# Patient Record
Sex: FEMALE | Race: WHITE | Employment: UNEMPLOYED | URBAN - METROPOLITAN AREA
[De-identification: names, ages, dates, MRNs, and addresses within clinical notes are randomized per-mention and may not be internally consistent; named-entity substitution may affect disease eponyms.]

---

## 2022-06-18 ENCOUNTER — APPOINTMENT (OUTPATIENT)
Dept: CT IMAGING | Age: 77
DRG: 482 | End: 2022-06-18
Payer: MEDICARE

## 2022-06-18 ENCOUNTER — APPOINTMENT (OUTPATIENT)
Dept: GENERAL RADIOLOGY | Age: 77
DRG: 482 | End: 2022-06-18
Payer: MEDICARE

## 2022-06-18 ENCOUNTER — HOSPITAL ENCOUNTER (INPATIENT)
Age: 77
LOS: 7 days | Discharge: NURSING, REHAB OR LONG-TERM CARE | DRG: 482 | End: 2022-06-25
Attending: EMERGENCY MEDICINE | Admitting: INTERNAL MEDICINE
Payer: MEDICARE
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
## 2022-06-18 DIAGNOSIS — S72.141A CLOSED INTERTROCHANTERIC FRACTURE OF HIP, RIGHT, INITIAL ENCOUNTER (HCC): Primary | ICD-10-CM

## 2022-06-18 LAB
A/G RATIO: 2.1 (ref 1.1–2.2)
ALBUMIN SERPL-MCNC: 4.4 G/DL (ref 3.4–5)
ALP BLD-CCNC: 123 U/L (ref 40–129)
ALT SERPL-CCNC: 13 U/L (ref 10–40)
ANION GAP SERPL CALCULATED.3IONS-SCNC: 12 MMOL/L (ref 3–16)
APTT: 29.5 SEC (ref 23–34.3)
AST SERPL-CCNC: 15 U/L (ref 15–37)
BASOPHILS ABSOLUTE: 0.1 K/UL (ref 0–0.2)
BASOPHILS RELATIVE PERCENT: 0.7 %
BILIRUB SERPL-MCNC: <0.2 MG/DL (ref 0–1)
BUN BLDV-MCNC: 21 MG/DL (ref 7–20)
CALCIUM SERPL-MCNC: 9.8 MG/DL (ref 8.3–10.6)
CHLORIDE BLD-SCNC: 105 MMOL/L (ref 99–110)
CO2: 20 MMOL/L (ref 21–32)
CREAT SERPL-MCNC: 0.8 MG/DL (ref 0.6–1.2)
EOSINOPHILS ABSOLUTE: 0.2 K/UL (ref 0–0.6)
EOSINOPHILS RELATIVE PERCENT: 1.6 %
GFR AFRICAN AMERICAN: >60
GFR NON-AFRICAN AMERICAN: >60
GLUCOSE BLD-MCNC: 104 MG/DL (ref 70–99)
HCT VFR BLD CALC: 42.6 % (ref 36–48)
HEMOGLOBIN: 14.1 G/DL (ref 12–16)
INR BLD: 1.01 (ref 0.87–1.14)
LYMPHOCYTES ABSOLUTE: 3.3 K/UL (ref 1–5.1)
LYMPHOCYTES RELATIVE PERCENT: 30.7 %
MCH RBC QN AUTO: 31.7 PG (ref 26–34)
MCHC RBC AUTO-ENTMCNC: 33.1 G/DL (ref 31–36)
MCV RBC AUTO: 96 FL (ref 80–100)
MONOCYTES ABSOLUTE: 1 K/UL (ref 0–1.3)
MONOCYTES RELATIVE PERCENT: 9.4 %
NEUTROPHILS ABSOLUTE: 6.2 K/UL (ref 1.7–7.7)
NEUTROPHILS RELATIVE PERCENT: 57.6 %
PDW BLD-RTO: 15 % (ref 12.4–15.4)
PLATELET # BLD: 235 K/UL (ref 135–450)
PMV BLD AUTO: 8.9 FL (ref 5–10.5)
POTASSIUM REFLEX MAGNESIUM: 4 MMOL/L (ref 3.5–5.1)
PROTHROMBIN TIME: 13.1 SEC (ref 11.7–14.5)
RBC # BLD: 4.44 M/UL (ref 4–5.2)
SARS-COV-2, NAAT: NOT DETECTED
SODIUM BLD-SCNC: 137 MMOL/L (ref 136–145)
SPECIMEN STATUS: NORMAL
TOTAL PROTEIN: 6.5 G/DL (ref 6.4–8.2)
WBC # BLD: 10.7 K/UL (ref 4–11)

## 2022-06-18 PROCEDURE — 80053 COMPREHEN METABOLIC PANEL: CPT

## 2022-06-18 PROCEDURE — 85610 PROTHROMBIN TIME: CPT

## 2022-06-18 PROCEDURE — 6360000002 HC RX W HCPCS: Performed by: NURSE PRACTITIONER

## 2022-06-18 PROCEDURE — 2700000000 HC OXYGEN THERAPY PER DAY

## 2022-06-18 PROCEDURE — 6360000002 HC RX W HCPCS

## 2022-06-18 PROCEDURE — 73502 X-RAY EXAM HIP UNI 2-3 VIEWS: CPT

## 2022-06-18 PROCEDURE — 6370000000 HC RX 637 (ALT 250 FOR IP): Performed by: INTERNAL MEDICINE

## 2022-06-18 PROCEDURE — 1200000000 HC SEMI PRIVATE

## 2022-06-18 PROCEDURE — 70450 CT HEAD/BRAIN W/O DYE: CPT

## 2022-06-18 PROCEDURE — 85025 COMPLETE CBC W/AUTO DIFF WBC: CPT

## 2022-06-18 PROCEDURE — 6360000002 HC RX W HCPCS: Performed by: EMERGENCY MEDICINE

## 2022-06-18 PROCEDURE — 96376 TX/PRO/DX INJ SAME DRUG ADON: CPT

## 2022-06-18 PROCEDURE — 87635 SARS-COV-2 COVID-19 AMP PRB: CPT

## 2022-06-18 PROCEDURE — 71045 X-RAY EXAM CHEST 1 VIEW: CPT

## 2022-06-18 PROCEDURE — 93005 ELECTROCARDIOGRAM TRACING: CPT | Performed by: NURSE PRACTITIONER

## 2022-06-18 PROCEDURE — 73552 X-RAY EXAM OF FEMUR 2/>: CPT

## 2022-06-18 PROCEDURE — 96374 THER/PROPH/DIAG INJ IV PUSH: CPT

## 2022-06-18 PROCEDURE — 96375 TX/PRO/DX INJ NEW DRUG ADDON: CPT

## 2022-06-18 PROCEDURE — 99285 EMERGENCY DEPT VISIT HI MDM: CPT

## 2022-06-18 PROCEDURE — 85730 THROMBOPLASTIN TIME PARTIAL: CPT

## 2022-06-18 RX ORDER — MORPHINE SULFATE 4 MG/ML
4 INJECTION, SOLUTION INTRAMUSCULAR; INTRAVENOUS
Status: DISCONTINUED | OUTPATIENT
Start: 2022-06-18 | End: 2022-06-19

## 2022-06-18 RX ORDER — SODIUM CHLORIDE 0.9 % (FLUSH) 0.9 %
5-40 SYRINGE (ML) INJECTION PRN
Status: DISCONTINUED | OUTPATIENT
Start: 2022-06-18 | End: 2022-06-25 | Stop reason: HOSPADM

## 2022-06-18 RX ORDER — ONDANSETRON 2 MG/ML
4 INJECTION INTRAMUSCULAR; INTRAVENOUS ONCE
Status: COMPLETED | OUTPATIENT
Start: 2022-06-18 | End: 2022-06-18

## 2022-06-18 RX ORDER — ONDANSETRON 2 MG/ML
4 INJECTION INTRAMUSCULAR; INTRAVENOUS EVERY 6 HOURS PRN
Status: DISCONTINUED | OUTPATIENT
Start: 2022-06-18 | End: 2022-06-25 | Stop reason: HOSPADM

## 2022-06-18 RX ORDER — LOSARTAN POTASSIUM 25 MG/1
100 TABLET ORAL DAILY
Status: DISCONTINUED | OUTPATIENT
Start: 2022-06-19 | End: 2022-06-25 | Stop reason: HOSPADM

## 2022-06-18 RX ORDER — AMLODIPINE BESYLATE 2.5 MG/1
2.5 TABLET ORAL DAILY
COMMUNITY

## 2022-06-18 RX ORDER — MORPHINE SULFATE 4 MG/ML
4 INJECTION, SOLUTION INTRAMUSCULAR; INTRAVENOUS
Status: DISCONTINUED | OUTPATIENT
Start: 2022-06-18 | End: 2022-06-20

## 2022-06-18 RX ORDER — ASPIRIN 325 MG
325 TABLET ORAL DAILY
Status: ON HOLD | COMMUNITY
End: 2022-06-25 | Stop reason: HOSPADM

## 2022-06-18 RX ORDER — AMLODIPINE BESYLATE 5 MG/1
5 TABLET ORAL DAILY
Status: DISCONTINUED | OUTPATIENT
Start: 2022-06-19 | End: 2022-06-25 | Stop reason: HOSPADM

## 2022-06-18 RX ORDER — SODIUM CHLORIDE 0.9 % (FLUSH) 0.9 %
5-40 SYRINGE (ML) INJECTION EVERY 12 HOURS SCHEDULED
Status: DISCONTINUED | OUTPATIENT
Start: 2022-06-18 | End: 2022-06-25 | Stop reason: HOSPADM

## 2022-06-18 RX ORDER — ALBUTEROL SULFATE 90 UG/1
2 AEROSOL, METERED RESPIRATORY (INHALATION) 4 TIMES DAILY
Status: DISCONTINUED | OUTPATIENT
Start: 2022-06-18 | End: 2022-06-19

## 2022-06-18 RX ORDER — MORPHINE SULFATE 4 MG/ML
4 INJECTION, SOLUTION INTRAMUSCULAR; INTRAVENOUS ONCE
Status: COMPLETED | OUTPATIENT
Start: 2022-06-18 | End: 2022-06-18

## 2022-06-18 RX ORDER — MORPHINE SULFATE 2 MG/ML
2 INJECTION, SOLUTION INTRAMUSCULAR; INTRAVENOUS
Status: DISCONTINUED | OUTPATIENT
Start: 2022-06-18 | End: 2022-06-20

## 2022-06-18 RX ORDER — MORPHINE SULFATE 4 MG/ML
INJECTION, SOLUTION INTRAMUSCULAR; INTRAVENOUS
Status: COMPLETED
Start: 2022-06-18 | End: 2022-06-18

## 2022-06-18 RX ORDER — SODIUM CHLORIDE 9 MG/ML
INJECTION, SOLUTION INTRAVENOUS PRN
Status: DISCONTINUED | OUTPATIENT
Start: 2022-06-18 | End: 2022-06-25 | Stop reason: HOSPADM

## 2022-06-18 RX ORDER — GLUCOSAM/CHONDRO/HERB 149/HYAL 750-100 MG
1 TABLET ORAL
COMMUNITY

## 2022-06-18 RX ORDER — OMEGA-3S/DHA/EPA/FISH OIL/D3 300MG-1000
400 CAPSULE ORAL DAILY
COMMUNITY

## 2022-06-18 RX ORDER — OXYCODONE HYDROCHLORIDE 5 MG/1
5 TABLET ORAL EVERY 4 HOURS PRN
Status: DISCONTINUED | OUTPATIENT
Start: 2022-06-18 | End: 2022-06-25 | Stop reason: HOSPADM

## 2022-06-18 RX ORDER — OXYCODONE HYDROCHLORIDE 5 MG/1
10 TABLET ORAL EVERY 4 HOURS PRN
Status: DISCONTINUED | OUTPATIENT
Start: 2022-06-18 | End: 2022-06-25 | Stop reason: HOSPADM

## 2022-06-18 RX ORDER — ONDANSETRON 2 MG/ML
INJECTION INTRAMUSCULAR; INTRAVENOUS
Status: COMPLETED
Start: 2022-06-18 | End: 2022-06-18

## 2022-06-18 RX ORDER — ESCITALOPRAM OXALATE 10 MG/1
20 TABLET ORAL DAILY
Status: DISCONTINUED | OUTPATIENT
Start: 2022-06-19 | End: 2022-06-25 | Stop reason: HOSPADM

## 2022-06-18 RX ORDER — SODIUM CHLORIDE 9 MG/ML
INJECTION, SOLUTION INTRAVENOUS CONTINUOUS
Status: DISCONTINUED | OUTPATIENT
Start: 2022-06-18 | End: 2022-06-22

## 2022-06-18 RX ORDER — POLYETHYLENE GLYCOL 3350 17 G/17G
17 POWDER, FOR SOLUTION ORAL DAILY PRN
Status: DISCONTINUED | OUTPATIENT
Start: 2022-06-18 | End: 2022-06-19

## 2022-06-18 RX ORDER — ESCITALOPRAM OXALATE 10 MG/1
10 TABLET ORAL DAILY
COMMUNITY

## 2022-06-18 RX ORDER — ONDANSETRON 4 MG/1
4 TABLET, ORALLY DISINTEGRATING ORAL EVERY 8 HOURS PRN
Status: DISCONTINUED | OUTPATIENT
Start: 2022-06-18 | End: 2022-06-25 | Stop reason: HOSPADM

## 2022-06-18 RX ADMIN — ONDANSETRON 4 MG: 2 INJECTION INTRAMUSCULAR; INTRAVENOUS at 19:39

## 2022-06-18 RX ADMIN — MORPHINE SULFATE 4 MG: 4 INJECTION, SOLUTION INTRAMUSCULAR; INTRAVENOUS at 19:39

## 2022-06-18 RX ADMIN — MORPHINE SULFATE 4 MG: 4 INJECTION, SOLUTION INTRAMUSCULAR; INTRAVENOUS at 20:29

## 2022-06-18 RX ADMIN — MORPHINE SULFATE 4 MG: 4 INJECTION, SOLUTION INTRAMUSCULAR; INTRAVENOUS at 22:43

## 2022-06-18 RX ADMIN — MORPHINE SULFATE 4 MG: 4 INJECTION, SOLUTION INTRAMUSCULAR; INTRAVENOUS at 21:39

## 2022-06-18 ASSESSMENT — PAIN SCALES - GENERAL
PAINLEVEL_OUTOF10: 5
PAINLEVEL_OUTOF10: 7
PAINLEVEL_OUTOF10: 6
PAINLEVEL_OUTOF10: 8

## 2022-06-18 ASSESSMENT — PAIN DESCRIPTION - INTENSITY: RATING_2: 2

## 2022-06-18 ASSESSMENT — PAIN DESCRIPTION - ORIENTATION
ORIENTATION_2: RIGHT
ORIENTATION: RIGHT
ORIENTATION: RIGHT

## 2022-06-18 ASSESSMENT — PAIN DESCRIPTION - LOCATION
LOCATION: HIP
LOCATION_2: ELBOW
LOCATION: HIP

## 2022-06-18 ASSESSMENT — PAIN - FUNCTIONAL ASSESSMENT: PAIN_FUNCTIONAL_ASSESSMENT: 0-10

## 2022-06-18 NOTE — ED TRIAGE NOTES
Philip Beauchamp is a 78 y.o. female who presents to the ED via EMS for 5 out of 10 constant right hip pain. Pt reports symptoms began at approximately 1900 today after sustaining a fall. Pt also reports she was walking to her car and her shoe slipped off, causing her to trip. Pt fell, with right hip being the point of impact onto concrete. Shortening and external rotation appear present on arrival to ED, with 3+/4 bilateral pedal pulses. Pt denies hitting head, LOC, anticoagulation, musculoskeletal or surgical Hx on affected limb, numbness/tingling. Pt resting in bed with call light within reach and updated on plan of care; pending provider to assess. Pt denies any needs at this time.

## 2022-06-18 NOTE — ED PROVIDER NOTES
I independently evaluated and obtained a history and physical on Jaye Phelps. I personally saw the patient and performed a substantive portion of the visit including all aspects of the medical decision making. All diagnostic, treatment, and disposition assistants were made to myself in conjunction the advanced practice provider. For further details of this patient's emergency department encounter, please see the advanced practice provider's documentation. History: Patient is a 77-year-old female who presents with right hip pain. Patient ports approximately 7 PM today she suffered a mechanical fall when her shoe slipped off when she was walking to her car causing her to trip and fall onto her right hip. She denies any pain any location other than her right hip. She reports her pain is severe constant and worsening. She denies any numbness or weakness. Physician Exam: Pleasant elderly  female. Right lower extremity shortened and externally rotated. 2+ DP pulses to right lower extremity. Sensation intact all nerve distributions of right lower extremity. MDM:    I personally saw the patient and performed a substantive portion of the visit including all aspects of the medical decision making. Work-up shows right intramedullary femur fracture. Patient has neurovascular intact. Patient does require multiple doses of IV pain medication to control her pain. She is admitted to hospital service with orthopedic surgery consulting on 4 surgical management. All parties expressed understanding and agreement with this plan. FINAL IMPRESSION      1.  [facility]               Rajesh Brady MD  06/18/22 3288

## 2022-06-18 NOTE — ED PROVIDER NOTES
Packs/day: 0.50    Smokeless tobacco: Never Used   Vaping Use    Vaping Use: Never used   Substance and Sexual Activity    Alcohol use: Yes    Drug use: Not on file    Sexual activity: Not on file   Other Topics Concern    Not on file   Social History Narrative    Not on file     Social Determinants of Health     Financial Resource Strain:     Difficulty of Paying Living Expenses: Not on file   Food Insecurity:     Worried About Running Out of Food in the Last Year: Not on file    Laisha of Food in the Last Year: Not on file   Transportation Needs:     Lack of Transportation (Medical): Not on file    Lack of Transportation (Non-Medical):  Not on file   Physical Activity:     Days of Exercise per Week: Not on file    Minutes of Exercise per Session: Not on file   Stress:     Feeling of Stress : Not on file   Social Connections:     Frequency of Communication with Friends and Family: Not on file    Frequency of Social Gatherings with Friends and Family: Not on file    Attends [employer] Services: Not on file    Active Member of [address] [place] [address] or Organizations: Not on file    Attends Club or Organization Meetings: Not on file    Marital Status: Not on file   Intimate Partner Violence:     Fear of Current or Ex-Partner: Not on file    Emotionally Abused: Not on file    Physically Abused: Not on file    Sexually Abused: Not on file   Housing Stability:     Unable to Pay for Housing in the Last Year: Not on file    Number of [place] in the Last Year: Not on file    Unstable Housing in the Last Year: Not on file     Current Facility-Administered Medications   Medication Dose Route Frequency Provider Last Rate Last Admin    morphine sulfate (PF) injection 4 mg  4 mg IntraVENous Q1H PRN EVELYN Horton - CNP   4 mg at 06/18/22 2139    [START ON 6/19/2022] ceFAZolin (ANCEF) 2000 mg in dextrose 5 % 100 mL IVPB  2,000 mg IntraVENous On Call to [id number] [facility], DO         No current outpatient medications on file. Allergies   Allergen Reactions    Latex Hives     \"My skin peels off if I have latex tape placed\"    Sulfa Antibiotics Hives and Itching    Levofloxacin      Throat tightness, flushed, redness  On 8th day of 10 day regimen     REVIEW OF SYSTEMS  10 systemsreviewed, pertinent positives per HPI otherwise noted to be negative    PHYSICAL EXAM  Physical Exam  Vitals:    06/18/22 2122   BP: (!) 137/95   Pulse: 90   Resp: 22   Temp:    SpO2: 96%     GENERAL APPEARANCE: Well developed, well nourished. Awake and alert. Cooperative. Observed resting in bed. No acute distress. Non-toxic in appearance. HEAD: Normocephalic. Atraumatic. EYES: Sclera is non-icteric. Conjunctiva normal.     ENT: External ears are normal. Mucous membranes are moist.   NECK: Supple. Normal ROM. Trachea mid-line. Cardiac: Regular rate and rhythm. Normal S1-S2 sounds. No murmurs, rubs, or gallops. Peripheral pulses are symmetric andstrong. No lower extremity edema present. LUNGS: Normal work of breathing. Speaking comfortably in full sentences. Lungs areclear bilaterally to auscultation. Without wheezing, rales, or rhonchi. Abdomen: Soft, nontender, nondistended with positive bowel sounds. Norebound tenderness, guarding or any peritoneal signs. No masses or hepatosplenomegaly  Musculoskeletal: Tenderness to palpation of the right hip,increased pain with range of motion of the right lower extremity. There is external rotation and shortening of the extremity noted. Pedal pulses palpable, capillary refill less than 3 seconds, neurologically intact, full sensation isintact. No obvious bony deformities. NEUROLOGICAL: Alert and oriented x3. Strength is normal. No focal motor or sensory deficits. SKIN: Warm and dry. Skin is with good color. Skin is intact. Psychiatric: Mood and affect appropriate. Speech is clear and articulate. LABS  [facility] reviewed all labs for this visit.    Results for orders placed or performed during the hospital encounter of 06/18/22   CBC with Auto Differential   Result Value Ref Range    WBC 10.7 4.0 - 11.0 K/uL    RBC 4.44 4.00 - 5.20 M/uL    Hemoglobin 14.1 12.0 - 16.0 g/dL    Hematocrit 42.6 36.0 - 48.0 %    MCV 96.0 80.0 - 100.0 fL    MCH 31.7 26.0 - 34.0 pg    MCHC 33.1 31.0 - 36.0 g/dL    RDW 15.0 12.4 - 15.4 %    Platelets 588 017 - 326 K/uL    MPV 8.9 5.0 - 10.5 fL    Neutrophils % 57.6 %    Lymphocytes % 30.7 %    Monocytes % 9.4 %    Eosinophils % 1.6 %    Basophils % 0.7 %    Neutrophils Absolute 6.2 1.7 - 7.7 K/uL    Lymphocytes Absolute 3.3 1.0 - 5.1 K/uL    Monocytes Absolute 1.0 0.0 - 1.3 K/uL    Eosinophils Absolute 0.2 0.0 - 0.6 K/uL    Basophils Absolute 0.1 0.0 - 0.2 K/uL   Comprehensive Metabolic Panel w/ Reflex to MG   Result Value Ref Range    Sodium 137 136 - 145 mmol/L    Potassium reflex Magnesium 4.0 3.5 - 5.1 mmol/L    Chloride 105 99 - 110 mmol/L    CO2 20 (L) 21 - 32 mmol/L    Anion Gap 12 3 - 16    Glucose 104 (H) 70 - 99 mg/dL    BUN 21 (H) 7 - 20 mg/dL    CREATININE 0.8 0.6 - 1.2 mg/dL    GFR Non-African American >60 >60    GFR African American >60 >60    Calcium 9.8 8.3 - 10.6 mg/dL    Total Protein 6.5 6.4 - 8.2 g/dL    Albumin 4.4 3.4 - 5.0 g/dL    Albumin/Globulin Ratio 2.1 1.1 - 2.2    Total Bilirubin <0.2 0.0 - 1.0 mg/dL    Alkaline Phosphatase 123 40 - 129 U/L    ALT 13 10 - 40 U/L    AST 15 15 - 37 U/L   Protime-INR   Result Value Ref Range    Protime 13.1 11.7 - 14.5 sec    INR 1.01 0.87 - 1.14   APTT   Result Value Ref Range    aPTT 29.5 23.0 - 34.3 sec   Sample possible blood bank testing   Result Value Ref Range    Specimen Status JAN        RADIOLOGY    XR HIP RIGHT (2-3 VIEWS)    Result Date: 6/18/2022  EXAMINATION: TWO XRAY VIEWS OF THE RIGHT HIP 6/18/2022 7:50 pm COMPARISON: None.  HISTORY: ORDERING SYSTEM PROVIDED HISTORY: right hip pain, fall TECHNOLOGIST PROVIDED HISTORY: Reason for exam:->right hip pain, fall Reason for Exam: right hip pain, fall FINDINGS: Mildly displaced and angulated intertrochanteric femur fracture. Moderately severe arthropathy in the right hip. There is mild degenerative change in the left hip. Pelvic alignment is maintained. Mildly displaced and angulated intertrochanteric right femur fracture. XR FEMUR RIGHT (MIN 2 VIEWS)    Result Date: 6/18/2022  EXAMINATION: 2 XRAY VIEWS OF THE RIGHT FEMUR 6/18/2022 8:57 pm COMPARISON: 06/18/2022 at 1952 hours HISTORY: ORDERING SYSTEM PROVIDED HISTORY: surgical planning hip fracture TECHNOLOGIST PROVIDED HISTORY: Reason for exam:->surgical planning hip fracture Reason for Exam: hip fracture, surgical planning FINDINGS: Frontal and lateral views of the proximal and distal aspects of the right femur were performed. There is an acute comminuted displaced intertrochanteric fracture of the proximal right femur, with cranial displacement of the distal fracture fragment and overriding of the fragments, unchanged from the study earlier today. No additional acute femoral fracture is identified. There is osteoarthritis at the right hip and knee joints. The remainder of the visualized osseous pelvis is intact. Scattered phleboliths are noted. No additional soft tissue abnormality is seen. Stable acute comminuted displaced intertrochanteric fracture of the proximal right femur, similar to the study earlier today. No additional femoral fracture is identified. CT Head WO Contrast    Result Date: 6/18/2022  EXAMINATION: CT OF THE HEAD WITHOUT CONTRAST  6/18/2022 8:42 pm TECHNIQUE: CT of the head was performed without the administration of intravenous contrast. Automated exposure control, iterative reconstruction, and/or weight based adjustment of the mA/kV was utilized to reduce the radiation dose to as low as reasonably achievable. COMPARISON: None.  HISTORY: ORDERING SYSTEM PROVIDED HISTORY: fall TECHNOLOGIST PROVIDED HISTORY: Reason for exam:->fall Has a \"code stroke\" or \"stroke alert\" been called? ->No Decision Support Exception - unselect if not a suspected or confirmed emergency medical condition->Emergency Medical Condition (MA) Reason for Exam: fall. pt denies hitting head. FINDINGS: BRAIN/VENTRICLES: There is no acute intracranial hemorrhage, mass effect or midline shift. No abnormal extra-axial fluid collection. Findings compatible with calcified meningioma measuring 1.7 cm along the lateral right convexity. Cortical atrophy and chronic white matter changes in the brain and associated ventricular enlargement are demonstrated. ORBITS: The visualized portion of the orbits demonstrate no acute abnormality. SINUSES: The visualized paranasal sinuses and mastoid air cells demonstrate no acute abnormality. SOFT TISSUES/SKULL:  No acute abnormality of the visualized skull or soft tissues. Chronic findings in the brain without acute CT abnormality identified. XR CHEST PORTABLE    Result Date: 6/18/2022  EXAMINATION: ONE XRAY VIEW OF THE CHEST 6/18/2022 8:21 pm COMPARISON: None. HISTORY: ORDERING SYSTEM PROVIDED HISTORY: fall TECHNOLOGIST PROVIDED HISTORY: Reason for exam:->fall Reason for Exam: fall FINDINGS: Clear lungs. No pneumothorax or pleural effusion. Cardiac and mediastinal contours normal.  No acute osseous abnormality. Right axillary surgical clips noted. Thoracolumbar scoliosis present. No acute cardiopulmonary abnormality. ED COURSE/MDM  Patient seen and evaluated. Old records reviewed. Diagnostic testing reviewed and results discussed. I have seen and evaluated this patient with supervising physician: Cj Cordoba MD. We thoroughly discussed the history, physical exam, diagnostic testing, emergency department course, plan and disposition. Chavez Watson presented to the ED today with above noted complaints. Arrival vital signs: Afebrile and hemodynamically stable. Well saturated on room air.   Physical exam performed at 1940: Right hip tenderness to palpation, pain with range of motion to the right hip. Right lower extremity distally neurovascularly intact. Patient does have right lower extremity external rotation and shortening. Blood work: Without evidence of systemic infection. No anemia. No electrolyte abnormality. No evidence of acute kidney injury or transaminitis. PT/INR within normal limits. APTT also within normal limits. Imaging: Right hip x-ray shows mildly displaced and angulated intertrochanteric right femur fracture. Chest x-ray obtained and without acute findings. CT head obtained and shows chronic findings in the brain without acute CT abnormality. Consults: Orthopedics was consulted and aware of the above. Medications given in the ED:   Medications   morphine sulfate (PF) injection 4 mg (4 mg IntraVENous Given 6/18/22 2139)   ceFAZolin (ANCEF) 2000 mg in dextrose 5 % 100 mL IVPB (has no administration in time range)   morphine sulfate (PF) injection 4 mg (4 mg IntraVENous Given 6/18/22 1939)   ondansetron (ZOFRAN) injection 4 mg (4 mg IntraVENous Given 6/18/22 1939)   morphine sulfate (PF) injection 4 mg (4 mg IntraVENous Given 6/18/22 2029)      Mirella Keys will be admitted for further observation and evaluation of Ron Fiore's right hip fracture. As I have deemed necessary from their history, physical, and studies, I have considered and evaluated Mirella Keys for the following diagnoses: COMPARTMENT SYNDROME, DEEP VENOUS THROMBOSIS, SEPTIC ARTHRITIS, TENDON OR NEUROVASCULAR INJURY. Final Impression    1. Closed intertrochanteric fracture of hip, right, initial encounter ([facility]    2. Fall, initial encounter        DISPOSITION  Admit    : Please note this report has been produced using speech recognition software and may contain errors related to that system including errors in grammar, punctuation, and spelling, as well as words and phrases that maybe inappropriate.  If there are any questions or concerns please feel free to contact the dictating provider for clarification.                Fadia Hicks, APRN - CNP  06/18/22 6760

## 2022-06-19 ENCOUNTER — ANESTHESIA EVENT (OUTPATIENT)
Dept: OPERATING ROOM | Age: 77
DRG: 482 | End: 2022-06-19
Payer: MEDICARE

## 2022-06-19 ENCOUNTER — APPOINTMENT (OUTPATIENT)
Dept: GENERAL RADIOLOGY | Age: 77
DRG: 482 | End: 2022-06-19
Payer: MEDICARE

## 2022-06-19 ENCOUNTER — ANESTHESIA (OUTPATIENT)
Dept: OPERATING ROOM | Age: 77
DRG: 482 | End: 2022-06-19
Payer: MEDICARE

## 2022-06-19 LAB
EKG ATRIAL RATE: 87 BPM
EKG DIAGNOSIS: NORMAL
EKG P AXIS: 62 DEGREES
EKG P-R INTERVAL: 160 MS
EKG Q-T INTERVAL: 390 MS
EKG QRS DURATION: 96 MS
EKG QTC CALCULATION (BAZETT): 469 MS
EKG R AXIS: 67 DEGREES
EKG T AXIS: 65 DEGREES
EKG VENTRICULAR RATE: 87 BPM

## 2022-06-19 PROCEDURE — 99223 1ST HOSP IP/OBS HIGH 75: CPT | Performed by: STUDENT IN AN ORGANIZED HEALTH CARE EDUCATION/TRAINING PROGRAM

## 2022-06-19 PROCEDURE — 6370000000 HC RX 637 (ALT 250 FOR IP): Performed by: STUDENT IN AN ORGANIZED HEALTH CARE EDUCATION/TRAINING PROGRAM

## 2022-06-19 PROCEDURE — 3600000004 HC SURGERY LEVEL 4 BASE: Performed by: STUDENT IN AN ORGANIZED HEALTH CARE EDUCATION/TRAINING PROGRAM

## 2022-06-19 PROCEDURE — 2500000003 HC RX 250 WO HCPCS: Performed by: ANESTHESIOLOGY

## 2022-06-19 PROCEDURE — 94761 N-INVAS EAR/PLS OXIMETRY MLT: CPT

## 2022-06-19 PROCEDURE — 97530 THERAPEUTIC ACTIVITIES: CPT

## 2022-06-19 PROCEDURE — 7100000000 HC PACU RECOVERY - FIRST 15 MIN: Performed by: STUDENT IN AN ORGANIZED HEALTH CARE EDUCATION/TRAINING PROGRAM

## 2022-06-19 PROCEDURE — 2700000000 HC OXYGEN THERAPY PER DAY

## 2022-06-19 PROCEDURE — 6360000002 HC RX W HCPCS: Performed by: ANESTHESIOLOGY

## 2022-06-19 PROCEDURE — 2580000003 HC RX 258: Performed by: INTERNAL MEDICINE

## 2022-06-19 PROCEDURE — 6360000002 HC RX W HCPCS: Performed by: INTERNAL MEDICINE

## 2022-06-19 PROCEDURE — 6360000002 HC RX W HCPCS: Performed by: STUDENT IN AN ORGANIZED HEALTH CARE EDUCATION/TRAINING PROGRAM

## 2022-06-19 PROCEDURE — 2780000010 HC IMPLANT OTHER: Performed by: STUDENT IN AN ORGANIZED HEALTH CARE EDUCATION/TRAINING PROGRAM

## 2022-06-19 PROCEDURE — 73502 X-RAY EXAM HIP UNI 2-3 VIEWS: CPT

## 2022-06-19 PROCEDURE — 2580000003 HC RX 258: Performed by: ANESTHESIOLOGY

## 2022-06-19 PROCEDURE — 0QS604Z REPOSITION RIGHT UPPER FEMUR WITH INTERNAL FIXATION DEVICE, OPEN APPROACH: ICD-10-PCS | Performed by: STUDENT IN AN ORGANIZED HEALTH CARE EDUCATION/TRAINING PROGRAM

## 2022-06-19 PROCEDURE — 3700000000 HC ANESTHESIA ATTENDED CARE: Performed by: STUDENT IN AN ORGANIZED HEALTH CARE EDUCATION/TRAINING PROGRAM

## 2022-06-19 PROCEDURE — C1713 ANCHOR/SCREW BN/BN,TIS/BN: HCPCS | Performed by: STUDENT IN AN ORGANIZED HEALTH CARE EDUCATION/TRAINING PROGRAM

## 2022-06-19 PROCEDURE — 2720000010 HC SURG SUPPLY STERILE: Performed by: STUDENT IN AN ORGANIZED HEALTH CARE EDUCATION/TRAINING PROGRAM

## 2022-06-19 PROCEDURE — 6370000000 HC RX 637 (ALT 250 FOR IP): Performed by: INTERNAL MEDICINE

## 2022-06-19 PROCEDURE — 3209999900 FLUORO FOR SURGICAL PROCEDURES

## 2022-06-19 PROCEDURE — 1200000000 HC SEMI PRIVATE

## 2022-06-19 PROCEDURE — 2709999900 HC NON-CHARGEABLE SUPPLY: Performed by: STUDENT IN AN ORGANIZED HEALTH CARE EDUCATION/TRAINING PROGRAM

## 2022-06-19 PROCEDURE — 97162 PT EVAL MOD COMPLEX 30 MIN: CPT

## 2022-06-19 PROCEDURE — 7100000001 HC PACU RECOVERY - ADDTL 15 MIN: Performed by: STUDENT IN AN ORGANIZED HEALTH CARE EDUCATION/TRAINING PROGRAM

## 2022-06-19 PROCEDURE — C1769 GUIDE WIRE: HCPCS | Performed by: STUDENT IN AN ORGANIZED HEALTH CARE EDUCATION/TRAINING PROGRAM

## 2022-06-19 PROCEDURE — 97166 OT EVAL MOD COMPLEX 45 MIN: CPT

## 2022-06-19 PROCEDURE — 93010 ELECTROCARDIOGRAM REPORT: CPT | Performed by: INTERNAL MEDICINE

## 2022-06-19 PROCEDURE — 3700000001 HC ADD 15 MINUTES (ANESTHESIA): Performed by: STUDENT IN AN ORGANIZED HEALTH CARE EDUCATION/TRAINING PROGRAM

## 2022-06-19 PROCEDURE — 3600000014 HC SURGERY LEVEL 4 ADDTL 15MIN: Performed by: STUDENT IN AN ORGANIZED HEALTH CARE EDUCATION/TRAINING PROGRAM

## 2022-06-19 PROCEDURE — 2580000003 HC RX 258: Performed by: STUDENT IN AN ORGANIZED HEALTH CARE EDUCATION/TRAINING PROGRAM

## 2022-06-19 PROCEDURE — 27245 TREAT THIGH FRACTURE: CPT | Performed by: STUDENT IN AN ORGANIZED HEALTH CARE EDUCATION/TRAINING PROGRAM

## 2022-06-19 DEVICE — IMPLANTABLE DEVICE: Type: IMPLANTABLE DEVICE | Site: HIP | Status: FUNCTIONAL

## 2022-06-19 DEVICE — SCREW BNE L36MM DIA5MM TIB LT GRN TI ST CANN LOK FULL THRD: Type: IMPLANTABLE DEVICE | Site: HIP | Status: FUNCTIONAL

## 2022-06-19 RX ORDER — OXYCODONE HYDROCHLORIDE 5 MG/1
5 TABLET ORAL PRN
Status: DISCONTINUED | OUTPATIENT
Start: 2022-06-19 | End: 2022-06-19 | Stop reason: HOSPADM

## 2022-06-19 RX ORDER — ONDANSETRON 2 MG/ML
INJECTION INTRAMUSCULAR; INTRAVENOUS PRN
Status: DISCONTINUED | OUTPATIENT
Start: 2022-06-19 | End: 2022-06-19 | Stop reason: SDUPTHER

## 2022-06-19 RX ORDER — SODIUM CHLORIDE 9 MG/ML
INJECTION, SOLUTION INTRAVENOUS CONTINUOUS PRN
Status: DISCONTINUED | OUTPATIENT
Start: 2022-06-19 | End: 2022-06-19 | Stop reason: SDUPTHER

## 2022-06-19 RX ORDER — ALBUTEROL SULFATE 90 UG/1
2 AEROSOL, METERED RESPIRATORY (INHALATION)
Status: DISCONTINUED | OUTPATIENT
Start: 2022-06-19 | End: 2022-06-19

## 2022-06-19 RX ORDER — LIDOCAINE HYDROCHLORIDE 20 MG/ML
INJECTION, SOLUTION INFILTRATION; PERINEURAL PRN
Status: DISCONTINUED | OUTPATIENT
Start: 2022-06-19 | End: 2022-06-19 | Stop reason: SDUPTHER

## 2022-06-19 RX ORDER — DIPHENHYDRAMINE HYDROCHLORIDE 50 MG/ML
12.5 INJECTION INTRAMUSCULAR; INTRAVENOUS
Status: DISCONTINUED | OUTPATIENT
Start: 2022-06-19 | End: 2022-06-19 | Stop reason: HOSPADM

## 2022-06-19 RX ORDER — SODIUM CHLORIDE 0.9 % (FLUSH) 0.9 %
5-40 SYRINGE (ML) INJECTION PRN
Status: DISCONTINUED | OUTPATIENT
Start: 2022-06-19 | End: 2022-06-19 | Stop reason: HOSPADM

## 2022-06-19 RX ORDER — ALBUTEROL SULFATE 90 UG/1
2 AEROSOL, METERED RESPIRATORY (INHALATION) EVERY 4 HOURS PRN
Status: DISCONTINUED | OUTPATIENT
Start: 2022-06-19 | End: 2022-06-25 | Stop reason: HOSPADM

## 2022-06-19 RX ORDER — SODIUM CHLORIDE 0.9 % (FLUSH) 0.9 %
5-40 SYRINGE (ML) INJECTION EVERY 12 HOURS SCHEDULED
Status: DISCONTINUED | OUTPATIENT
Start: 2022-06-19 | End: 2022-06-19 | Stop reason: HOSPADM

## 2022-06-19 RX ORDER — ROCURONIUM BROMIDE 10 MG/ML
INJECTION, SOLUTION INTRAVENOUS PRN
Status: DISCONTINUED | OUTPATIENT
Start: 2022-06-19 | End: 2022-06-19 | Stop reason: SDUPTHER

## 2022-06-19 RX ORDER — LABETALOL HYDROCHLORIDE 5 MG/ML
10 INJECTION, SOLUTION INTRAVENOUS
Status: DISCONTINUED | OUTPATIENT
Start: 2022-06-19 | End: 2022-06-19 | Stop reason: HOSPADM

## 2022-06-19 RX ORDER — POLYETHYLENE GLYCOL 3350 17 G/17G
17 POWDER, FOR SOLUTION ORAL DAILY
Status: DISCONTINUED | OUTPATIENT
Start: 2022-06-20 | End: 2022-06-22

## 2022-06-19 RX ORDER — FENTANYL CITRATE 50 UG/ML
INJECTION, SOLUTION INTRAMUSCULAR; INTRAVENOUS PRN
Status: DISCONTINUED | OUTPATIENT
Start: 2022-06-19 | End: 2022-06-19 | Stop reason: SDUPTHER

## 2022-06-19 RX ORDER — SODIUM CHLORIDE 9 MG/ML
25 INJECTION, SOLUTION INTRAVENOUS PRN
Status: DISCONTINUED | OUTPATIENT
Start: 2022-06-19 | End: 2022-06-19 | Stop reason: HOSPADM

## 2022-06-19 RX ORDER — OXYCODONE HYDROCHLORIDE 5 MG/1
10 TABLET ORAL PRN
Status: DISCONTINUED | OUTPATIENT
Start: 2022-06-19 | End: 2022-06-19 | Stop reason: HOSPADM

## 2022-06-19 RX ORDER — PHENYLEPHRINE HCL IN 0.9% NACL 1 MG/10 ML
SYRINGE (ML) INTRAVENOUS PRN
Status: DISCONTINUED | OUTPATIENT
Start: 2022-06-19 | End: 2022-06-19 | Stop reason: SDUPTHER

## 2022-06-19 RX ORDER — PROPOFOL 10 MG/ML
INJECTION, EMULSION INTRAVENOUS PRN
Status: DISCONTINUED | OUTPATIENT
Start: 2022-06-19 | End: 2022-06-19 | Stop reason: SDUPTHER

## 2022-06-19 RX ORDER — ONDANSETRON 2 MG/ML
4 INJECTION INTRAMUSCULAR; INTRAVENOUS
Status: DISCONTINUED | OUTPATIENT
Start: 2022-06-19 | End: 2022-06-19 | Stop reason: HOSPADM

## 2022-06-19 RX ADMIN — SODIUM CHLORIDE, PRESERVATIVE FREE 10 ML: 5 INJECTION INTRAVENOUS at 01:15

## 2022-06-19 RX ADMIN — MORPHINE SULFATE 4 MG: 4 INJECTION INTRAVENOUS at 17:45

## 2022-06-19 RX ADMIN — OXYCODONE 10 MG: 5 TABLET ORAL at 00:43

## 2022-06-19 RX ADMIN — SODIUM CHLORIDE 50 ML/HR: 9 INJECTION, SOLUTION INTRAVENOUS at 01:13

## 2022-06-19 RX ADMIN — FENTANYL CITRATE 25 MCG: 50 INJECTION INTRAMUSCULAR; INTRAVENOUS at 10:03

## 2022-06-19 RX ADMIN — PROPOFOL 130 MG: 10 INJECTION, EMULSION INTRAVENOUS at 08:29

## 2022-06-19 RX ADMIN — CEFAZOLIN SODIUM 2000 MG: 10 INJECTION, POWDER, FOR SOLUTION INTRAVENOUS at 08:41

## 2022-06-19 RX ADMIN — MORPHINE SULFATE 4 MG: 4 INJECTION INTRAVENOUS at 05:03

## 2022-06-19 RX ADMIN — CEFAZOLIN 2000 MG: 10 INJECTION, POWDER, FOR SOLUTION INTRAVENOUS at 23:23

## 2022-06-19 RX ADMIN — OXYCODONE 10 MG: 5 TABLET ORAL at 14:09

## 2022-06-19 RX ADMIN — SODIUM CHLORIDE: 9 INJECTION, SOLUTION INTRAVENOUS at 11:18

## 2022-06-19 RX ADMIN — HYDROMORPHONE HYDROCHLORIDE 0.5 MG: 1 INJECTION, SOLUTION INTRAMUSCULAR; INTRAVENOUS; SUBCUTANEOUS at 10:48

## 2022-06-19 RX ADMIN — ONDANSETRON 4 MG: 2 INJECTION INTRAMUSCULAR; INTRAVENOUS at 10:10

## 2022-06-19 RX ADMIN — Medication 100 MCG: at 08:58

## 2022-06-19 RX ADMIN — Medication 100 MCG: at 09:48

## 2022-06-19 RX ADMIN — CEFAZOLIN 2000 MG: 10 INJECTION, POWDER, FOR SOLUTION INTRAVENOUS at 16:37

## 2022-06-19 RX ADMIN — Medication 100 MCG: at 08:35

## 2022-06-19 RX ADMIN — SODIUM CHLORIDE: 9 INJECTION, SOLUTION INTRAVENOUS at 09:40

## 2022-06-19 RX ADMIN — OXYCODONE 10 MG: 5 TABLET ORAL at 23:22

## 2022-06-19 RX ADMIN — SUGAMMADEX 100 MG: 100 INJECTION, SOLUTION INTRAVENOUS at 10:05

## 2022-06-19 RX ADMIN — Medication 100 MCG: at 09:43

## 2022-06-19 RX ADMIN — SODIUM CHLORIDE: 9 INJECTION, SOLUTION INTRAVENOUS at 08:24

## 2022-06-19 RX ADMIN — ROCURONIUM BROMIDE 40 MG: 10 SOLUTION INTRAVENOUS at 08:29

## 2022-06-19 RX ADMIN — HYDROMORPHONE HYDROCHLORIDE 0.5 MG: 1 INJECTION, SOLUTION INTRAMUSCULAR; INTRAVENOUS; SUBCUTANEOUS at 10:32

## 2022-06-19 RX ADMIN — FENTANYL CITRATE 50 MCG: 50 INJECTION INTRAMUSCULAR; INTRAVENOUS at 08:26

## 2022-06-19 RX ADMIN — FENTANYL CITRATE 25 MCG: 50 INJECTION INTRAMUSCULAR; INTRAVENOUS at 09:19

## 2022-06-19 RX ADMIN — MORPHINE SULFATE 2 MG: 2 INJECTION, SOLUTION INTRAMUSCULAR; INTRAVENOUS at 02:56

## 2022-06-19 RX ADMIN — LIDOCAINE HYDROCHLORIDE 3 ML: 20 INJECTION, SOLUTION INFILTRATION; PERINEURAL at 08:29

## 2022-06-19 ASSESSMENT — PAIN DESCRIPTION - LOCATION
LOCATION: HIP
LOCATION: LEG
LOCATION: HIP
LOCATION: HIP
LOCATION: INCISION
LOCATION: HIP
LOCATION: HIP

## 2022-06-19 ASSESSMENT — LIFESTYLE VARIABLES
SMOKING_STATUS: 1
HOW OFTEN DO YOU HAVE A DRINK CONTAINING ALCOHOL: 2-3 TIMES A WEEK
HOW MANY STANDARD DRINKS CONTAINING ALCOHOL DO YOU HAVE ON A TYPICAL DAY: 1 OR 2

## 2022-06-19 ASSESSMENT — PAIN SCALES - GENERAL
PAINLEVEL_OUTOF10: 8
PAINLEVEL_OUTOF10: 10
PAINLEVEL_OUTOF10: 7
PAINLEVEL_OUTOF10: 9
PAINLEVEL_OUTOF10: 6
PAINLEVEL_OUTOF10: 7
PAINLEVEL_OUTOF10: 8
PAINLEVEL_OUTOF10: 7
PAINLEVEL_OUTOF10: 8
PAINLEVEL_OUTOF10: 9
PAINLEVEL_OUTOF10: 8

## 2022-06-19 ASSESSMENT — PAIN DESCRIPTION - ORIENTATION
ORIENTATION: RIGHT

## 2022-06-19 ASSESSMENT — PAIN DESCRIPTION - DESCRIPTORS
DESCRIPTORS: ACHING
DESCRIPTORS: ACHING

## 2022-06-19 NOTE — OP NOTE
Operative Note    Patient: Jaye Phelps  YOB: 1945  MRN: [id number]    Date of Procedure: 6/19/2022    Pre-Op Diagnosis: RIGHT HIP FRACTURE    Post-Op Diagnosis: R hip IT fracture with basicervical extension       Procedure(s):  RIGHT HIP IM NAIL VIOLET INSERTION    Surgeon(s):  Alireza Ferrer DO    Assistant:  Surgical Assistant: Obie Bustamante    Anesthesia: General and local exparel    Estimated Blood Loss (mL): 150cc    IVF: 1000cc crystalloids    Complications: None    Specimens:   * No specimens in log *    Implants:  Implant Name Type Inv. Item Serial No.  Lot No. LRB No. Used Action   NAIL IM L170MM [id number] 130DEG SHT FEM GRN TI ANNIE ARABELLA - [id number]  NAIL IM L170MM [id number] 130DEG SHT FEM GRN TI ANNIE ARABELLA  DEPUY SYNTHES [facility] [id number] Right 1 Implanted   BLADE IM L95MM DIA10.35MM PROX FEM G TI [employer] FEN DIAN FOR - [id number]  BLADE IM L95MM DIA10.35MM PROX FEM G TI ANNIE FEN DIAN FOR  DEPUY SYNTHES [place] [id number] Right 1 Implanted   SCREW BNE L36MM DIA5MM TIB LT GRN TI ST ANNIE BANDAR FULL THRD - [id number]  SCREW BNE L36MM DIA5MM TIB LT GRN TI ST ANNIE BANDAR FULL THRD  DEPUY SYNTHES [employer] [id number] Right 1 Implanted   Implants: Synthes TFNA short 11/130, 95mm blade, 36mm screw      Drains:   Urinary Catheter Suazo (Active)   $ Urethral catheter insertion Inserted for procedure 06/18/22 2330   Catheter Indications Perioperative use for selected surgical procedures 06/18/22 2330   Site Assessment Pink 06/18/22 2330   Urine Color Yellow 06/18/22 2330   Urine Appearance Clear 06/18/22 2330   Securement Method Leg strap 06/18/22 2330   Catheter Care Completed Yes 06/18/22 2330   Status Draining 06/18/22 2330   Output (mL) 300 mL 06/19/22 0656       Operative indications: The patient is a 77F who sustained a right hip intertrochanteric fracture. Operative indications were met for open reduction internal fixation with intramedullary nailing.   After discussing risk and benefits of nonoperative versus operative approach, given the increased risk of blood clots, pneumonia, continued pain, and sepsis, given that the patient was medically stable for surgery, operative intervention was chosen. Operative summary:  The patient was met in the preoperative area and the appropriate surgical site was marked. Written consent was obtained after discussing the risks, benefits, and alternative treatment options with patient in detail. The patient agreed to move forward with the proposed procedure. The patient was wheeled back to the operative suite. General anesthesia was induced by the anesthesia team without complication. The patient was then transferred over to the operative table in a supine position. The patient was then set up on the fracture table with the operative leg in a leg burnham and the opposite leg in a well-leg burnham. All bony prominences were padded. The ipsilateral arm was draped across the body and well-padded. Before prepping and draping the C-arm was utilized to evaluate fracture reduction with the fracture table. Once appropriate alignment was noted and the fracture appeared amenable to fixation, the patient was then prepped and draped in normal sterile fashion. A timeout was performed with all necessary parties in the room. Surgical site was confirmed. Ancef was given preoperatively. C-arm imaging was utilized to localize a 3.2 mm K wire for starting position on the greater trochanteric tip just medial to the tip. A 10 blade scalpel was utilized to make a 4 cm incision along the path of the wire for nail insertion. Sharp dissection was performed through the abductor fascia. Bleeding was controlled with Bovie cautery.      Short Nail:  Starting guidewire was drilled to the appropriate position of the greater trochanter and into the intramedullary canal.  This was confirmed on C arm imaging and AP and lateral.  An opening reamer was then utilized with a soft tissue protector to create an opening in the greater trochanter. All instrumentation was removed. Reduction was maintained with a bone hook at the inferior neck. A Synthes 11/130 short nail TFNA was inserted. A 130 degree neck was chosen. Once the nail was set to the appropriate depth and reduction was maintained, the aiming sleeve was utilized to make a 3 cm incision along the path of the sleeve and a K wire was inserted up to the center center position of the femoral head. The appropriate tip to apex distance was achieved. Lateral cortex was then breached and the helical blade measured to be 95 mm of length was utilized and inserted. The helical blade was then locked into place with the flexible screwdriver. A half turn backwards was performed to allow for dynamization compression, which was then compressed through the jig before final static locking. The distal locking screw was inserted with the aiming arm. A small incision was made through the skin and IT band with a 10-blade in line with the guide. The guide was run down to bone and drilled bi-cortically. It was measured to a distance of 36mm. The locking screw was inserted with good bite. Final imaging demonstrated appropriate alignment of the nail and the fracture of the hip. The wounds were then copiously irrigated with normal saline. The deep fascia was closed with interrupted 0 Vicryl suture. Subcutaneous tissue was closed with 2-0 Vicryl interrupted. The wounds were injected with a mix of exparel and marcaine. Skin was closed with 4-0 monocryl and prineo. Mepilex dressings were placed over the wounds. The patient was then awoken from general anesthesia without complication and transferred back to the PACU in stable condition. Postoperative plan:  The patient will be weightbearing as tolerated with physical therapy assistance. PT and OT will work with the patient postoperatively as well social work for potential placement.   The patient will be on Lovenox for 4 weeks for DVT prophylaxis. Percocet for pain control with IV morphine for breakthrough only. Follow-up with me in 10 to 14 days for repeat x-rays in the outpatient setting. Follow-up with the patient's primary care provider recommended to evaluate for osteoporosis given the fragility fracture they sustained.     Electronically signed by Ivy Rebollar DO on 6/19/2022 at 9:53 AM

## 2022-06-19 NOTE — PROGRESS NOTES
Physical Therapy  Facility/Department: [place] [address] - MED SURG/ORTHO  Physical Therapy Initial Assessment/Treatment    Name: Jennifer Alfred  : 1945  MRN: [id number]  Date of Service: 2022    Discharge Recommendations:  Continue to assess pending progress (CTA pending further assessment of mobility)   PT Equipment Recommendations  Equipment Needed: Yes  Mobility Devices: Kelleen Balderas: Rolling  Other: If d/c home, pt will likely require RW      Patient Diagnosis(es): The primary encounter diagnosis was Closed intertrochanteric fracture of hip, right, initial encounter ([facility] [address] A diagnosis of Fall, initial encounter was also pertinent to this visit. Past Medical History:  has a past medical history of Breast cancer ([facility] [address] COPD (chronic obstructive pulmonary disease) ([facility] [address] HTN (hypertension), Lung cancer ([facility] [address] and Tobacco abuse. Past Surgical History:  has a past surgical history that includes Lung removal, partial; Mastectomy, bilateral; and Hip fracture surgery (Right). Assessment   Body Structures, Functions, Activity Limitations Requiring Skilled Therapeutic Intervention: Decreased functional mobility ; Decreased endurance;Decreased ROM; Decreased sensation; Increased pain;Decreased balance;Decreased strength  Assessment: Pt is a 78 y.o. female admitted to [facility] secondary to fall with R hip fx s/p IMN , per orders WBAT on RLE. Pt reports [facility] SHERIF and is typically I with functional mobility and gait. Pt is currently funcitoning below her baseline requiring Annalisa to [address] for bed mobility and SBA for sitting balance. Pt with symptomatic orthostatic BP while seated EOB 80/41 so unable to attempt standing this date. RN notified. Pt will benefit from continued skilled PT in acute care setting to address above deficits. Will CTA for d/c recommendations pending ability to further assess t/f and gait. Anticipate if pt d/c home she will require RW.   Treatment Diagnosis: Impaired balance and functional mobility  Specific Instructions for Next Treatment: Progress mobility as tolerated, assess t/f and gait if tolerated  Therapy Prognosis: Good  Decision Making: Medium Complexity  Barriers to Learning: Fatigue  Requires PT Follow-Up: Yes  Activity Tolerance  Activity Tolerance: Patient limited by fatigue;Patient limited by pain; Patient limited by endurance;Treatment limited secondary to medical complications  Activity Tolerance Comments: BP drops to 81/40 while seated EOB with pt reporting feeling drowsy and dizzines. BP improved to 156/97 with return to supine, RN notified of orthostatic BP     Plan   Plan  Plan: 1 time a day 7 days a week  Specific Instructions for Next Treatment: Progress mobility as tolerated, assess t/f and gait if tolerated  Current Treatment Recommendations: Strengthening,Equipment evaluation, education, & procurement,Balance training,Gait training,Modalities,Stair training,Functional mobility training,Transfer training,Neuromuscular re-education,Home exercise program,Positioning,Safety education & training,Patient/Caregiver education & training,Therapeutic activities,Endurance training  Safety Devices  Type of Devices: Patient at risk for falls,All fall risk precautions in place,Left in bed,Call light within reach,Nurse notified,Gait belt,Bed alarm in place     Restrictions  Restrictions/Precautions  Restrictions/Precautions: Weight Bearing,Fall Risk  Lower Extremity Weight Bearing Restrictions  Right Lower Extremity Weight Bearing: Weight Bearing As Tolerated  Position Activity Restriction  Other position/activity restrictions: 2L O2 NC     Subjective   Pain: Pt reports 6/10 pain at R hip  General  Chart Reviewed: Yes  Patient assessed for rehabilitation services?: Yes  Additional Pertinent Hx: Franck Victoria NP H&P \"67 y.o. female who presents to the ED complaining of right hip pain.   Patient is a former smoker, status post bilateral mastectomy in 1999 for invasive ductal carcinoma with a TRAM flap, adenocarcinoma of lung with left lower lobectomy in 2002, right upper and middle lobe resections in 2015 with multiple bilateral pulmonary nodules that are being followed by [facility] and have been stable at her last yearly checkup in 3/23/22, status post abdominal hernia repair. Patient reports that she was getting out of her car and fell because her footing was stuck and landed on her right hip\"  Response To Previous Treatment: Not applicable  Family / Caregiver Present: No  Referring Practitioner:  Ventura Gage DO  Referral Date : 06/19/22  Diagnosis: Fall R hip fx s/p IM nailing  Follows Commands: Within Functional Limits  General Comment  Comments: RN cleared pt for session  Subjective  Subjective: Pt supine in bed on approach, reports feeling drowsy but agreeable to PT evaluation         Social/Functional History  Social/Functional History  Lives With: Son  Type of Home: Condo  Home Layout: One level  Home Access: Stairs to enter with rails  Entrance Stairs - Number of Steps: 4  Entrance Stairs - Rails: Left  Bathroom Shower/Tub: Walk-in shower,Shower chair with back  Bathroom Toilet: Handicap height  Bathroom Equipment: Grab bars in shower,Grab bars around toilet  Home Equipment:  (No DME)  Has the patient had two or more falls in the past year or any fall with injury in the past year?: Yes (Pt reports only fall is one leading to admission)  ADL Assistance: [address]: Independent  Homemaking Responsibilities: Yes  Meal Prep Responsibility: Primary  Ambulation Assistance: Independent (Without AD)  Transfer Assistance: Independent  Active : Yes  Type of Occupation:  1 day per week  Additional Comments: Pt's son works from home  [address]: Impaired  Vision Exceptions: Wears glasses for reading  Hearing  Hearing: Within functional limits    Cognition   Orientation  Overall Orientation Status: Within Normal Limits     Objective   Heart Rate: 79  Heart Rate Source: Monitor  BP: (!) 156/97  BP Location: Left upper arm  BP Method: Automatic  Patient Position: Semi fowlers  MAP (Calculated): 116.67  Resp: 18  SpO2: 94 %  O2 Device: Nasal cannula  Comment: 2L O2 NC     Observation/Palpation  Posture: Fair  Edema: Slight edema to RLE  Gross Assessment  AROM: Generally decreased, functional  PROM: Generally decreased, functional (R hip and knee ROM decreased secondary to pain)  Strength: Generally decreased, functional (R hip 2+/5, R knee 3/5, R ankle WFL)                    Bed mobility  Supine to Sit: Minimal assistance (Annalisa for RLE, increased time to complete, HOB elevated, use of BR, increased time to complete)  Sit to Supine:  Moderate assistance     Transfers  Sit to Stand: Unable to assess  Stand to sit: Unable to assess  Lateral Transfers: Minimal Assistance;2 Person Assistance (Annalisa x 2 for lateral scooting to [facility])  Comment: T/f deferred this session d/t orthostatic hypotension with BP 80/41 seated EOB and pt orthostatic        Balance  Posture: Fair  Sitting - Static: Good  Sitting - Dynamic: Good  Comments: SBA sitting EOB, unable to assess standing             AM-PAC Score  AM-PAC Inpatient Mobility Raw Score : 10 (06/19/22 1708)  AM-PAC Inpatient T-Scale Score : 32.29 (06/19/22 1708)  Mobility Inpatient CMS 0-100% Score: 76.75 (06/19/22 1708)  Mobility Inpatient CMS G-Code Modifier : CL (06/19/22 1708)          Goals  Short Term Goals  Time Frame for Short term goals: 1 week 6/26/22 (unless otherwise specified)  Short term goal 1: Pt will complete supine to/from sit with I  Short term goal 2: Pt will complete sit to/from stand with LRAD with SBA  Short term goal 3: Pt will ambulate 48' with RW with SBA without LOB  Short term goal 4: Pt will ascend/descend 4 steps with HR with CGA without LOB  Short term goal 5: 6/23/22: Pt will participate in 12-15 reps BLE exercises to increase strength and increase I with functional mobility and gait  Patient Goals   Patient goals : \"Get stronger\"       Education  Patient Education  Education Given To: Patient  Education Provided: Role of Therapy;Plan of Care;Precautions;Transfer Training  Education Provided Comments: Educated in [employer] px, healing and progression of activity. Pt educated on safe use of AD  Education Method: Demonstration;Verbal  Barriers to Learning: None  Education Outcome: Verbalized understanding;Demonstrated understanding      Therapy Time   Individual Concurrent Group Co-treatment   Time In 1539         Time Out 1608         Minutes 29         Timed Code Treatment Minutes: 19 Minutes (10 minutes for eval)       If pt is unable to be seen after this session, please let this note serve as discharge summary. Please see case management note for discharge disposition. Thank you.     Dona Negro, PT, DPT

## 2022-06-19 NOTE — ANESTHESIA PRE PROCEDURE
Department of Anesthesiology  Preprocedure Note       Name:  Bobbi Hinson   Age:  78 y.o.  :  1945                                          MRN:  [id number]         Date:  2022      Surgeon: Jose Amor):  Lata Lancaster DO    Procedure: Procedure(s):  RIGHT HIP IM NAIL VIOLET INSERTION    Medications prior to admission:   Prior to Admission medications    Medication Sig Start Date End Date Taking?  Authorizing Provider   escitalopram (LEXAPRO) 10 MG tablet Take 10 mg by mouth daily   Yes Historical Provider, MD   amLODIPine (NORVASC) 2.5 MG tablet Take 2.5 mg by mouth daily   Yes Historical Provider, MD   aspirin 325 MG tablet Take 325 mg by mouth daily   Yes Historical Provider, MD   vitamin D3 (CHOLECALCIFEROL) 10 MCG (400 UNIT) TABS tablet Take 400 Units by mouth daily   Yes Historical Provider, MD   Misc Natural Products (GLUCOSAMINE CHOND CMP ADVANCED) TABS Take 1 tablet by mouth   Yes Historical Provider, MD       Current medications:    Current Facility-Administered Medications   Medication Dose Route Frequency Provider Last Rate Last Admin    albuterol sulfate HFA (PROVENTIL;VENTOLIN;PROAIR) 108 (90 Base) MCG/ACT inhaler 2 puff  2 puff Inhalation Q4H WA Leopold Crandall, MD        And    ipratropium (ATROVENT HFA) 17 MCG/ACT inhaler 2 puff  2 puff Inhalation Q4H WA Leopold Crandall, MD        [START ON 2022] polyethylene glycol (GLYCOLAX) packet 17 g  17 g Oral Daily Giorgio Gupta        ceFAZolin (ANCEF) 2000 mg in dextrose 5 % 100 mL IVPB  2,000 mg IntraVENous On Call to [id number] [place] [address],         sodium chloride flush 0.9 % injection 5-40 mL  5-40 mL IntraVENous 2 times per day Leopold Crandall, MD   10 mL at 22 0115    sodium chloride flush 0.9 % injection 5-40 mL  5-40 mL IntraVENous PRN Leopold Crandall, MD        0.9 % sodium chloride infusion   IntraVENous PRN Leopold Crandall, MD        ondansetron (ZOFRAN-ODT) disintegrating tablet 4 mg  4 mg Oral Q8H PRN Johnny Astudillo MD        Or    ondansetron [facility]) injection 4 mg  4 mg IntraVENous Q6H PRN Johnny Astudillo MD        0.9 % sodium chloride infusion   IntraVENous Continuous Johnny Astudillo MD 50 mL/hr at 06/19/22 0656 Rate Verify at 06/19/22 0656    oxyCODONE (ROXICODONE) immediate release tablet 5 mg  5 mg Oral Q4H PRN Johnny Astudillo MD        Or    oxyCODONE (ROXICODONE) immediate release tablet 10 mg  10 mg Oral Q4H PRN Johnny Astudillo MD   10 mg at 06/19/22 0043    morphine (PF) injection 2 mg  2 mg IntraVENous Q2H PRN Johnny Astudillo MD   2 mg at 06/19/22 0256    Or    morphine sulfate (PF) injection 4 mg  4 mg IntraVENous Q2H PRN Johnny Astudillo MD   4 mg at 06/19/22 0503    escitalopram (LEXAPRO) tablet 20 mg  20 mg Oral Daily Johnny Astudillo MD        amLODIPine (NORVASC) tablet 5 mg  5 mg Oral Daily Johnny Astudillo MD        [Held by provider] losartan (COZAAR) tablet 100 mg  100 mg Oral Daily Johnny Astudillo MD           Allergies: Allergies   Allergen Reactions    Latex Hives     \"My skin peels off if I have latex tape placed\"    Sulfa Antibiotics Hives and Itching    Levofloxacin      Throat tightness, flushed, redness  On 8th day of 10 day regimen       Problem List:    Patient Active Problem List   Diagnosis Code    Closed comminuted intertrochanteric fracture of proximal end of right femur, initial encounter ([facility] [address] S72.141A    Essential hypertension I10    Generalized anxiety disorder F41.1    Malignant neoplasm of lung ([facility] [address] C34.90    Tobacco use disorder F17.200       Past Medical History:  History reviewed. No pertinent past medical history. Past Surgical History:  History reviewed. No pertinent surgical history.     Social History:    Social History     Tobacco Use    Smoking status: Current Every Day Smoker     Packs/day: 0.50     Types: Cigarettes    Smokeless tobacco: Never Used   Substance Use Topics  Alcohol use: Yes     Alcohol/week: 1.0 standard drink     Types: 1 Glasses of wine per week                                Ready to quit: Not Answered  Counseling given: Not Answered      Vital Signs (Current):   Vitals:    06/18/22 2300 06/18/22 2315 06/19/22 0300 06/19/22 0645   BP: 133/77   117/81   Pulse: 89  83 87   Resp: 18 18 16 16   Temp: 98.4 °F (36.9 °C)  98.2 °F (36.8 °C) 98.2 °F (36.8 °C)   TempSrc: Oral Oral Oral Oral   SpO2: 94%  90%    Weight: 150 lb 5.7 oz (68.2 kg)      Height: 5' 2\" (1.575 m)                                                 BP Readings from Last 3 Encounters:   06/19/22 117/81       NPO Status:                                                                                 BMI:   Wt Readings from Last 3 Encounters:   06/18/22 150 lb 5.7 oz (68.2 kg)     Body mass index is 27.5 kg/m². CBC:   Lab Results   Component Value Date    WBC 10.7 06/18/2022    RBC 4.44 06/18/2022    HGB 14.1 06/18/2022    HCT 42.6 06/18/2022    MCV 96.0 06/18/2022    RDW 15.0 06/18/2022     06/18/2022       CMP:   Lab Results   Component Value Date     06/18/2022    K 4.0 06/18/2022     06/18/2022    CO2 20 06/18/2022    BUN 21 06/18/2022    CREATININE 0.8 06/18/2022    GFRAA >60 06/18/2022    AGRATIO 2.1 06/18/2022    LABGLOM >60 06/18/2022    GLUCOSE 104 06/18/2022    PROT 6.5 06/18/2022    CALCIUM 9.8 06/18/2022    BILITOT <0.2 06/18/2022    ALKPHOS 123 06/18/2022    AST 15 06/18/2022    ALT 13 06/18/2022       POC Tests: No results for input(s): POCGLU, POCNA, POCK, POCCL, POCBUN, POCHEMO, POCHCT in the last 72 hours.     Coags:   Lab Results   Component Value Date    PROTIME 13.1 06/18/2022    INR 1.01 06/18/2022    APTT 29.5 06/18/2022       HCG (If Applicable): No results found for: PREGTESTUR, PREGSERUM, HCG, HCGQUANT     ABGs: No results found for: PHART, PO2ART, QCR7VST, MNK1AGB, BEART, X6DEAYHJ     Type & Screen (If Applicable):  No results found for: LABABO, [address]    Drug/Infectious Status (If Applicable):  No results found for: HIV, HEPCAB    COVID-19 Screening (If Applicable):   Lab Results   Component Value Date    COVID19 Not Detected 06/18/2022           Anesthesia Evaluation   no history of anesthetic complications:   Airway: Mallampati: II  TM distance: <3 FB   Neck ROM: limited  Mouth opening: > = 3 FB   Dental: normal exam         Pulmonary:   (+) current smoker                          ROS comment: LUNG ca S/P LOBECTOMY   Cardiovascular:    (+) hypertension:,                   Neuro/Psych:   (+) psychiatric history:depression/anxiety             GI/Hepatic/Renal: Neg GI/Hepatic/Renal ROS            Endo/Other: Negative Endo/Other ROS                    Abdominal:             Vascular: negative vascular ROS. Other Findings:           Anesthesia Plan      general     ASA 3     (Pt agrees to risks, benefits and alternatives of , GETA . Questions answered. Willing to proceed with plan.)  Induction: intravenous. Anesthetic plan and risks discussed with patient.                         Lico Julse MD   6/19/2022

## 2022-06-19 NOTE — BRIEF OP NOTE
Brief Postoperative Note      Patient: Christopher Conde  YOB: 1945  MRN: [id number]    Date of Procedure: 6/19/2022    Pre-Op Diagnosis: RIGHT HIP FRACTURE    Post-Op Diagnosis: R hip IT fracture with basicervical extension       Procedure(s):  RIGHT HIP IM NAIL VIOLET INSERTION    Surgeon(s):  Ventura Gage DO    Assistant:  Surgical Assistant: Amy Alfred    Anesthesia: General and local exparel    Estimated Blood Loss (mL): 150cc    IVF: 1000cc crystalloids    Complications: None    Specimens:   * No specimens in log *    Implants:  Implant Name Type Inv.  Item Serial No.  Lot No. LRB No. Used Action   NAIL IM L170MM [id number] 130DEG SHT FEM GRN TI [employer] ARABELLA - [id number]  NAIL IM L170MM [id number] 130DEG SHT FEM GRN TI ANNIE ARABELLA  DEPUY SYNTHES [facility] [id number] Right 1 Implanted   BLADE IM L95MM DIA10.35MM PROX FEM G TI [employer] FEN DIAN FOR - [id number]  BLADE IM L95MM DIA10.35MM PROX FEM G TI NANIE FEN DIAN FOR  DEPUY SYNTHES [place]- [id number] Right 1 Implanted   SCREW BNE L36MM DIA5MM TIB LT GRN TI ST ANNIE BANDAR FULL THRD - [id number]  SCREW BNE L36MM DIA5MM TIB LT GRN TI ST ANNIE BANDAR FULL THRD  DEPUY SYNTHES [place]-WD [id number] Right 1 Implanted   Implants: Synthes TFNA short 11/130, 95mm blade, 36mm screw      Drains:   Urinary Catheter Suazo (Active)   $ Urethral catheter insertion Inserted for procedure 06/18/22 2330   Catheter Indications Perioperative use for selected surgical procedures 06/18/22 2330   Site Assessment Pink 06/18/22 2330   Urine Color Yellow 06/18/22 2330   Urine Appearance Clear 06/18/22 2330   Securement Method Leg strap 06/18/22 2330   Catheter Care Completed Yes 06/18/22 2330   Status Draining 06/18/22 2330   Output (mL) 300 mL 06/19/22 0656       Findings: see op note    Electronically signed by Ventura Gage DO on 6/19/2022 at 9:52 AM

## 2022-06-19 NOTE — CONSULTS
Called [facility] orthopedics @ 2038  RE: hip fracture Per Chinedu Tyler responded @ [phone]

## 2022-06-19 NOTE — PROGRESS NOTES
D: pt completed PACU stay, VSS, surgical pain is at a tolerable level, incision dressing intact. Report called to Shelbie Reina, floor RN, awaiting for transportation.

## 2022-06-19 NOTE — PROGRESS NOTES
D: Pt brought to PACU. Report obtained from OR RN and anesthesia. Pt placed on monitor and O2, VSS, drowsy but easily arouses to voice. Right hip surgical incision intact, ice applied, figueroa draining clear yellow urine.

## 2022-06-19 NOTE — PROGRESS NOTES
Hospitalist Progress Note      PCP: No primary care provider on file. Date of Admission: 6/18/2022    Chief Complaint: Fall, right hip pain    Hospital Course: Reviewed H&P    Subjective: Patient seen with her  at bedside. Just Returned from Right Hip IM nail fixation . Continued to be sleepy/drowsy from anesthesia. Discussed care plan with  at bedside. Medications:  Reviewed    Infusion Medications    sodium chloride      sodium chloride 50 mL/hr at 06/19/22 1118     Scheduled Medications    [START ON 6/20/2022] polyethylene glycol  17 g Oral Daily    ceFAZolin  2,000 mg IntraVENous Q8H    sodium chloride flush  5-40 mL IntraVENous 2 times per day    escitalopram  20 mg Oral Daily    amLODIPine  5 mg Oral Daily    [Held by provider] losartan  100 mg Oral Daily     PRN Meds: albuterol sulfate HFA **AND** ipratropium, sodium chloride flush, sodium chloride, ondansetron **OR** ondansetron, oxyCODONE **OR** oxyCODONE, morphine **OR** morphine      Intake/Output Summary (Last 24 hours) at 6/19/2022 1504  Last data filed at 6/19/2022 1021  Gross per 24 hour   Intake 1431.68 ml   Output 1350 ml   Net 81.68 ml       Physical Exam Performed:  /79   Pulse 80   Temp 98.2 °F (36.8 °C) (Oral)   Resp 16   Ht 5' 2\" (1.575 m)   Wt 150 lb 5.7 oz (68.2 kg)   SpO2 91%   BMI 27.50 kg/m²     General appearance: No apparent distress, appears stated age and cooperative. HEENT: Pupils equal, round, and reactive to light. Conjunctivae/corneas clear. Neck: Supple, with full range of motion. No jugular venous distention. Trachea midline. Respiratory:  Normal respiratory effort. Clear to auscultation, bilaterally without Rales/Wheezes/Rhonchi. Cardiovascular: Regular rate and rhythm with normal S1/S2 without murmurs, rubs or gallops. Abdomen: Soft, non-tender, non-distended with normal bowel sounds. Musculoskeletal: No clubbing, cyanosis or edema bilaterally.  S/p Right Hip IM nail fixation   Skin: Skin color, texture, turgor normal.  No rashes or lesions. Neurologic:  Neurovascularly intact without any focal sensory/motor deficits. Cranial nerves: II-XII intact, grossly non-focal.  Psychiatric: Alert and oriented, thought content appropriate, normal insight  Capillary Refill: Brisk,< 3 seconds   Peripheral Pulses: +2 palpable, equal bilaterally       Labs:   Recent Labs     06/18/22 1940   WBC 10.7   HGB 14.1   HCT 42.6        Recent Labs     06/18/22 1940      K 4.0      CO2 20*   BUN 21*   CREATININE 0.8   CALCIUM 9.8     Recent Labs     06/18/22 1940   AST 15   ALT 13   BILITOT <0.2   ALKPHOS 123     Recent Labs     06/18/22 1940   INR 1.01     Radiology:  XR HIP 2-3 VW W PELVIS RIGHT   Final Result   Anatomic alignment status post ORIF right femoral neck         XR HIP RIGHT (2-3 VIEWS)   Final Result   Successful reduction of right femoral intertrochanteric fracture with   intramedullary nail system. FLUORO FOR SURGICAL PROCEDURES   Final Result      XR FEMUR RIGHT (MIN 2 VIEWS)   Preliminary Result   Stable acute comminuted displaced intertrochanteric fracture of the proximal   right femur, similar to the study earlier today. No additional femoral   fracture is identified. CT Head WO Contrast   Final Result   Chronic findings in the brain without acute CT abnormality identified. XR CHEST PORTABLE   Final Result   No acute cardiopulmonary abnormality. XR HIP RIGHT (2-3 VIEWS)   Final Result   Mildly displaced and angulated intertrochanteric right femur fracture.              Active Hospital Problems    Diagnosis Date Noted    Closed commin[facility] Aly Rosas 06/18/2022     Priority: Medium    Tobacco use disorder [F17.200] 07/26/2016     Priority: Medium    Generalized anxiety disorder [F41.1] 07/26/2016     Priority: Medium    Essential hypertension [I10] 07/26/2016     Priority: Medium    Malignant neoplasm of lung ([facility] [address] [C34.90] 03/05/2008     Priority: Medium     Assessment/Plan:  Closed comminuted intertrochanteric fracture of proximal end of right femur, initial encounter   2/2 mechanical fall  -Orthopedics consulted - S/p Right Hip IM nail fixation on 6/19/22    -Post-op Pain control, DVT prophylaxis, PT/OT per Ortho   - encourage Incentive spirometry  -No evidence of neurovascular compromise      Tobacco abuse,? COPD  -Counseled cessation  -Continue Combivent inhaler 4 times daily  -Continue perioperative incentive spirometry     Hx of lung cancer  Apparently in remission  S/p lobectomy. Followed by [facility] surgery.      Hx of breast cancer  S/p bilateral mastectomy     HTN-controlled, resume Norvasc. Hold losartan preop due to risk of intraoperative hypotension. Resume after surgery     JAMEY-mood stable, resume Lexapro    DVT Prophylaxis: Per Ortho   Diet: ADULT DIET; Regular  Code Status: Full Code    PT/OT Eval Status: Will need    Dispo -likely 2 days pending clinical improvement       The note was completed using Dragon -speech recognition software & EMR  . Every effort was made to ensure accuracy; however, inadvertent computerized transcription errors may be present.     Farida Velarde MD

## 2022-06-19 NOTE — PLAN OF CARE
Protect patient from fall injury by keeping bed in low position, use of non skid socks and bed alarm system. Keep belongings and call light with reach at all times and following fall protocol. Encourage patient to ask for pain medication before pain is above a 5 on the 1/10 pain scale. Medicate before PT or increased activity. Use alternatives such as ice (if ordered) or distraction as often as possible to minimize  need for medication.

## 2022-06-19 NOTE — PROGRESS NOTES
Pt received back to unit from PACU. VS stables. IV fluids infusion. SCDs applied. O2 set at 3L per min. RN will continue to monitor.      Juan Nice RN

## 2022-06-19 NOTE — RT PROTOCOL NOTE
RT Inhaler-Nebulizer Bronchodilator Protocol Note    There is a bronchodilator order in the chart from a provider indicating to follow the RT Bronchodilator Protocol and there is an Initiate RT Inhaler-Nebulizer Bronchodilator Protocol order as well (see protocol at bottom of note). CXR Findings:  XR CHEST PORTABLE    Result Date: 6/18/2022  No acute cardiopulmonary abnormality. The findings from the last RT Protocol Assessment were as follows:   History Pulmonary Disease: Chronic pulmonary disease  Respiratory Pattern: Regular pattern and RR 12-20 bpm  Breath Sounds: Clear breath sounds  Cough: Strong, spontaneous, non-productive  Indication for Bronchodilator Therapy: On home bronchodilators  Bronchodilator Assessment Score: 2    Aerosolized bronchodilator medication orders have been revised according to the RT Inhaler-Nebulizer Bronchodilator Protocol below. Respiratory Therapist to perform RT Therapy Protocol Assessment initially then follow the protocol. Repeat RT Therapy Protocol Assessment PRN for score 0-3 or on second treatment, BID, and PRN for scores above 3. No Indications - adjust the frequency to every 6 hours PRN wheezing or bronchospasm, if no treatments needed after 48 hours then discontinue using Per Protocol order mode. If indication present, adjust the RT bronchodilator orders based on the Bronchodilator Assessment Score as indicated below. Use Inhaler orders unless patient has one or more of the following: on home nebulizer, not able to hold breath for 10 seconds, is not alert and oriented, cannot activate and use MDI correctly, or respiratory rate 25 breaths per minute or more, then use the equivalent nebulizer order(s) with same Frequency and PRN reasons based on the score. If a patient is on this medication at home then do not decrease Frequency below that used at home.     0-3 - enter or revise RT bronchodilator order(s) to equivalent RT Bronchodilator order with Frequency of every 4 hours PRN for wheezing or increased work of breathing using Per Protocol order mode. 4-6 - enter or revise RT Bronchodilator order(s) to two equivalent RT bronchodilator orders with one order with BID Frequency and one order with Frequency of every 4 hours PRN wheezing or increased work of breathing using Per Protocol order mode. 7-10 - enter or revise RT Bronchodilator order(s) to two equivalent RT bronchodilator orders with one order with TID Frequency and one order with Frequency of every 4 hours PRN wheezing or increased work of breathing using Per Protocol order mode. 11-13 - enter or revise RT Bronchodilator order(s) to one equivalent RT bronchodilator order with QID Frequency and an Albuterol order with Frequency of every 4 hours PRN wheezing or increased work of breathing using Per Protocol order mode. Greater than 13 - enter or revise RT Bronchodilator order(s) to one equivalent RT bronchodilator order with every 4 hours Frequency and an Albuterol order with Frequency of every 2 hours PRN wheezing or increased work of breathing using Per Protocol order mode. RT to enter RT Home Evaluation for COPD & MDI Assessment order using Per Protocol order mode.     Electronically signed by Avery Sellers RCP on 6/19/2022 at 12:54 AM

## 2022-06-19 NOTE — ANESTHESIA POSTPROCEDURE EVALUATION
Department of Anesthesiology  Postprocedure Note    Patient: Carmen Robledo  MRN: [id number]  YOB: 1945  Date of evaluation: 6/19/2022  Time:  11:34 AM     Procedure Summary     Date: 06/19/22 Room / Location: [facility]    Anesthesia Start: 4686 Anesthesia Stop: 4240    Procedure: RIGHT HIP IM NAIL VIOLET INSERTION (Right Hip) Diagnosis:       Closed right hip fracture, [facility]      (RIGHT HIP FRACTURE)    Surgeons: Tim Sage DO Responsible Provider: Grace Barajas MD    Anesthesia Type: general ASA Status: 3          Anesthesia Type: No value filed. Jeremy Phase I: Jeremy Score: 8    Jeremy Phase II:      Last vitals: Reviewed and per EMR flowsheets. Anesthesia Post Evaluation    Patient location during evaluation: PACU  Patient participation: complete - patient participated  Level of consciousness: awake and alert  Airway patency: patent  Nausea & Vomiting: no nausea and no vomiting  Complications: no  Cardiovascular status: blood pressure returned to baseline  Respiratory status: acceptable  Hydration status: euvolemic  Comments: VSS on transfer to phase 2 recovery. No anesthetic complications.

## 2022-06-19 NOTE — CONSULTS
Consultant: Karsten Turcios DO  From: ED  Reason: R hip fracture    Chief Complaint   Patient presents with    Hip Pain     Right. See triage note        History of Present Illness      Tabatha Valentin is a 78 y.o. female who presents with RIGHT hip pain. Patient sustained a mechanical fall resulting in the injury. She tripped getting out of her car and sustained a R hip IT fracture. She has a distant history of breast and lung cancer, but both have been in remission. She takes an aspirin but no other blood thinners. She denies numbness and tingling in the leg. She is a smoker. Baseline functional level: independent ambulator        Past History     History reviewed. No pertinent past medical history. History reviewed. No pertinent surgical history. History reviewed. No pertinent family history. Social History     Tobacco Use    Smoking status: Current Every Day Smoker     Packs/day: 0.50     Types: Cigarettes    Smokeless tobacco: Never Used   Substance Use Topics    Alcohol use: Yes     Alcohol/week: 1.0 standard drink     Types: 1 Glasses of wine per week      No current facility-administered medications on file prior to encounter. Current Outpatient Medications on File Prior to Encounter   Medication Sig Dispense Refill    escitalopram (LEXAPRO) 10 MG tablet Take 10 mg by mouth daily      amLODIPine (NORVASC) 2.5 MG tablet Take 2.5 mg by mouth daily      aspirin 325 MG tablet Take 325 mg by mouth daily      vitamin D3 (CHOLECALCIFEROL) 10 MCG (400 UNIT) TABS tablet Take 400 Units by mouth daily      Misc Natural Products (GLUCOSAMINE CHOND CMP ADVANCED) TABS Take 1 tablet by mouth        Latex, Sulfa antibiotics, and Levofloxacin    Review of Systems      10-point ROS is negative other than what's mentioned in HPI.     Physical Exam    Based off 1997 Exam Criteria    /81   Pulse 87   Temp 98.2 °F (36.8 °C) (Oral)   Resp 16   Ht 5' 2\" (1.575 m)   Wt 150 lb 5.7 oz (68.2 kg) SpO2 90%   BMI 27.50 kg/m²      Constitutional:       General: He is not in acute distress. Appearance: Normal appearance. Cardiovascular:      Rate and Rhythm: Normal rate and regular rhythm. Pulses: Normal pulses. Pulmonary:      Effort: Pulmonary effort is normal. No respiratory distress. Neurological:      Mental Status: Patient is alert and oriented to person, place, and time. Mental status is at baseline. Musculoskeletal:  R Hip: Leg resting short and ER, tender to palpation at hip. Compartments soft. EHL/FHL/TA/GS complex motor intact. Sural, saphenous, superificial/deep peroneal, and plantar nerve distribution sensation grossly intact. Dorsalis pedis/posterior tibial pulses 2+ with BCR. Otherwise extremities atraumatic    Imaging       Right Hip  Radiographs: XR R hip demonstrates a displaced and shortened IT fracture extending into the basicervical area of the neck      Assessment and Plan      I discussed with Ivone Patel that her history, symptoms, signs and imaging are most consistent with   S72.141A Displaced intertrochanteric hip fracture  Osteoporosis/Osteopenia    We reviewed the natural history of these conditions and treatment options ranging from conservative measures to surgical options. This is clearly an operative injury due to its acute nature and level of disability associated with nonoperative treatment. I think she is an appropriate candidate for surgery due to her ongoing symptoms. The procedure would be  61705 Open Treatment Pertrochanteric Femur fracture w/ intramedullary nail    Perioperative considerations include: Optimization by Hospitalist - completed. We reviewed the risks, benefits, alternatives of this approach.  We discussed risks including, but not limited to, bleeding, pain, infection, scarring, damage to the neurovascular structures, blood clots, pulmonary embolus, stiffness, implant instability or loosening, implant failure, incomplete relief of pain, and incomplete return of function. Patient is at particularly higher risk perioperatively due to age, frailty, medical comorbidities, cancer history, and smoking history    Plan for R hip IMN this morning. NPO. Ancef OCTOR. PT eval post op. We also reviewed the surgical details, expected recovery, and rehabilitation (6-9 months). She expressed understanding and will undergo preoperative medical evaluation and optimization. Osteoporosis/osteopenia management:  I discussed the patient's postoperative fracture care. Patient likely has osteoporosis/osteopenia evidenced by the current fragility fracture from low-energy trauma. Patient is at additional risk for having another break. We reviewed evidence based guidelines. I recommend endocrinology work-up and follow-up. Patient needs extensive bone health work-up/DEXA scan to determine any concomitant risks or predisposing factors worsening osteoporosis/osteopenia. I recommend calcium and vitamin D supplementation in the meantime. She may be a candidate for additional bone density modifying medications. Electronically signed by Neha Castillo DO on 6/19/2022 at 7:42 AM  This dictation was generated by voice recognition computer software. Although all attempts are made to edit the dictation for accuracy, there may be errors in the transcription that are not intended.

## 2022-06-19 NOTE — PLAN OF CARE
Ortho Plan of Care    Received a call regarding this patient's right hip. She sustained a right hip IT fracture after a fall going to her car. She has a history of breast cancer in the late 90s/early 2000s and lung cancer most recently treated in 2015 followed at [facility]. ED reports no major medical conditions and no blood thinner use.     Plan  - NPO after midnight, hold any planned blood clot ppx  - Plan for OR at 800am in the morning tomorrow for R hip intramedullary nailing.  - Admission for hospitalist evaluation and surgical risk stratification please, greatly appreciated  - Full consult note to follow    Tammi Marin DO  06/18/22 8:58 PM

## 2022-06-19 NOTE — H&P
Hospital Medicine History & Physical      PCP: No primary care provider on file. Date of Admission: 6/18/2022    Date of Service: Pt seen/examined on 6/18/22 and Admitted to Inpatient with expected LOS greater than two midnights due to medical therapy. Chief Complaint:  Fall, right hip pain      History Of Present Illness:   78 y.o. female who presented to [facility] with above complaints  Patient with PMH of lung CA s/p resection, currently in remission, Hx of breast cancer s/p surgery, tobacco abuse, HTN, COPD presenting to the ED today with complaints of fall. Patient reports earlier today she was getting out of her car when she lost balance, tripped and fell on her right side with the brunt of the impact on her right hip. After the fall she had severe right hip and right groin pain due to which she was unable to stand or ambulate. No head trauma or loss of consciousness. No chest pain. Denies any neck pain or headache. Patient apparently had been drinking prior to the fall, she consumed 1 drink. Patient reports she is very active physically. Is able to walk a few blocks, climb a couple of flights of stairs easily without any symptoms. Past Medical History:    Diagnosis Date    Breast cancer ([address] 1999    Hypertension    Incisional hernia    [facility]    Pain in neck   Pain in neck (723.1)       Past Surgical History:    12/11/2002 Surgery   left lower lobe lobectomy for moderately to poorly differentiated adenocarcinoma with papillary features  1999 Surgery   bilateral mastectomy with TRAM flap reconstruction for invasive ductal carcinoma      Medications Prior to Admission:   Current Meds:   Outpatient Medications as of 2/1/2022   Medication Sig    escitalopram (LEXAPRO) 20 MG TABS TAKE 1 TABLET BY MOUTH EVERY DAY    ALBUTEROL 108 (90 Base) mcg/puff AERS INHALE 2 PUFFS BY MOUTH EVERY 6 HOURS AS NEEDED    amLODIPine (NORVASC) 5 MG TABS Take 1 tablet by mouth daily.     losartan (COZAAR) 100 MG TABS TAKE 1 TABLET BY MOUTH DAILY    Zinc 100 MG TABS Take by mouth.  ascorbic acid (VITAMIN C) 250 MG TABS Take 250 mg by mouth 3 (three) times daily.  Vitamin D, Cholecalciferol, 50 MCG (2000 UT) CAPS Take by mouth.  glucosamine-chondroitin 500-400 MG CAPS Take 1 capsule by mouth daily.  aspirin 325 MG TABS Take 325 mg by mouth daily. Allergies:  Latex, Sulfa antibiotics, and Levofloxacin    Social History:      The patient currently lives at home    TOBACCO:   reports that she has been smoking. She has been smoking about 0.50 packs per day. She has never used smokeless tobacco.  ETOH:   reports current alcohol use. E-Cigarettes/Vaping Use     Questions Responses    E-Cigarette/Vaping Use Never User    Start Date     Passive Exposure     Quit Date     Counseling Given     Comments             Family History:  Reviewed in detail and negative for DM, CAD, Cancer, CVA    REVIEW OF SYSTEMS COMPLETED:   Pertinent positives as noted in the HPI. All other systems reviewed and negative. PHYSICAL EXAM PERFORMED:    BP (!) 137/95   Pulse 90   Temp 98.4 °F (36.9 °C) (Oral)   Resp 22   Ht 5' 2\" (1.575 m)   Wt 135 lb (61.2 kg)   SpO2 96%   BMI 24.69 kg/m²     General appearance:  No apparent distress, appears stated age and cooperative. HEENT:  Normal cephalic, atraumatic without obvious deformity. Pupils equal, round, and reactive to light. Extra ocular muscles intact. Conjunctivae/corneas clear. Neck: Supple, with full range of motion. No jugular venous distention. Trachea midline. Respiratory:  Normal respiratory effort. Clear to auscultation, bilaterally without Rales/Wheezes/Rhonchi. Cardiovascular:  Regular rate and rhythm with normal S1/S2 without murmurs, rubs or gallops. Abdomen: Soft, non-tender, non-distended with normal bowel sounds. Musculoskeletal:  No clubbing, cyanosis or edema bilaterally.   Full range of motion without deformity except right lower extremity as below  RLE shortened, externally rotated. Severe right hip pain with logroll. Tenderness over the greater trochanteric region of right hip. Skin: Skin color, texture, turgor normal.  No rashes or lesions. Neurologic:  Neurovascularly intact without any focal sensory/motor deficits. Cranial nerves: II-XII intact, grossly non-focal.  Psychiatric:  Alert and oriented, thought content appropriate, normal insight  Capillary Refill: Brisk,3 seconds, normal  Peripheral Pulses: +2 palpable, equal bilaterally       Labs:     Recent Labs     06/18/22 1940   WBC 10.7   HGB 14.1   HCT 42.6        Recent Labs     06/18/22 1940      K 4.0      CO2 20*   BUN 21*   CREATININE 0.8   CALCIUM 9.8     Recent Labs     06/18/22 1940   AST 15   ALT 13   BILITOT <0.2   ALKPHOS 123     Recent Labs     06/18/22 1940   INR 1.01     No results for input(s): Ev[employer]iler in the last 72 hours. Urinalysis:    No results found for: Maria Antonia Doan, [address] Evans Professor Ned Chapa [facility] [address], [address] [place] [address] 27, [facility] 994    Radiology:     Reviewed the x-ray right femur, CT head, chest x-ray and x-ray right hip and also reviewed all the radiologist interpretation      EKG:  I have reviewed the EKG with the following interpretation: NSR, rate 87, no acute ischemic changes, no prior EKG to compare      XR FEMUR RIGHT (MIN 2 VIEWS)   Preliminary Result   Stable acute comminuted displaced intertrochanteric fracture of the proximal   right femur, similar to the study earlier today. No additional femoral   fracture is identified. CT Head WO Contrast   Final Result   Chronic findings in the brain without acute CT abnormality identified. XR CHEST PORTABLE   Final Result   No acute cardiopulmonary abnormality. XR HIP RIGHT (2-3 VIEWS)   Final Result   Mildly displaced and angulated intertrochanteric right femur fracture.              Consults:    IP CONSULT TO ORTHOPEDIC SURGERY  IP CONSULT TO HOSPITALIST    ASSESSMENT:PLAN:    Closed comminuted intertrochanteric fracture of proximal end of right femur, initial encounter   2/2 mechanical fall  -Orthopedics consulted  -N.p.o. after midnight, plan for surgery in a.m.  -Pain control  -SCD for DVT prophylaxis  -Incentive spirometry  -No evidence of neurovascular compromise    Preoperative cardiac risk stratification  Pre-Operative Risk assessment using 2014 ACC/AHA guidelines     Emergent procedure No  Active Cardiac Condition No (decompensated HF, Arrhythmia, MI <3 weeks, severe valve disease)  Risk Level of Procedure Intermediate Risk (intraperitoneal, intrathoracic, HENT, orthopedic, or carotid endarterectomy, etc.)  Revised Cardiac Risk Index Risk factors: None  Measurement of Exercise Tolerance before Surgery >4 Yes    According to the 2014 ACC/AHA pre-operative risk assessment guidelines Carmen Robledo is a low risk for major cardiac complications during a intermediate risk procedure and may continue as planned. Specific medication recommendations are listed below. Medications recommended to continue should be taken with a sip of water even when NPO. Further recommendations from consultants: None    Medication Recommendations:  ACEI/ARB Hold one dose piror to surgery  ASA should be continued the day of surgery postop when okay with orthopedics      Tobacco abuse,? COPD  -Counseled cessation  -Continue Combivent inhaler 4 times daily  -Continue perioperative incentive spirometry    Hx of lung cancer  Apparently in remission  S/p lobectomy. Followed by [address] surgery. Hx of breast cancer  S/p bilateral mastectomy    HTN-controlled, resume Norvasc. Hold losartan preop due to risk of intraoperative hypotension.   Resume after surgery    JAMEY-mood stable, resume Lexapro    DVT Prophylaxis: scd  Diet: No diet orders on file  Code Status: full  PT/OT Eval Status: Pending per orthopedics    Dispo -IP stay       Brenna Rueda

## 2022-06-19 NOTE — PROGRESS NOTES
Occupational Therapy  Facility/Department: [place] [id number] - MED SURG/ORTHO  Occupational Therapy Initial Assessment    Name: Rupert Hodge  : 1945  MRN: [id number]  Date of Service: 2022    Discharge Recommendations:  Continue to assess pending progress          Patient Diagnosis(es): The primary encounter diagnosis was Closed intertrochanteric fracture of hip, right, initial encounter ([facility] A diagnosis of Fall, initial encounter was also pertinent to this visit. Past Medical History:  has a past medical history of Breast cancer ([facility] obstructive [facility] HTN (hypertension), Lung cancer ([facility] and Tobacco abuse. Past Surgical History:  has a past surgical history that includes Lung removal, partial; Mastectomy, bilateral; and Hip fracture surgery (Right). Treatment Diagnosis: Impaired mobility      Assessment   Performance deficits / Impairments: Decreased functional mobility ; Decreased safe awareness;Decreased balance;Decreased endurance;Decreased strength;Decreased ADL status; Decreased ROM; Decreased posture  Assessment: Pt is a 77 yo female presenting to [facility] s/p R THR with WBAT status. PTA, pt reporting living in a condo with her son and being IND with I/ADLs. Currently pt is presented below baseline requiring min-mod Ax2 for bed mobility. Transfer not attempted this date d/t drop in BP. Pt returned to bed at EOS. CTA pending progress.   Treatment Diagnosis: Impaired mobility  Prognosis: Fair  Decision Making: Medium Complexity  REQUIRES OT FOLLOW-UP: Yes  Activity Tolerance  Activity Tolerance: Treatment limited secondary to medical complications (free text)  Activity Tolerance Comments: BP at beginning of session 137/83, at EOB 81/40, in semifowlers at [address] [facility] per Week: 3-4x  Times per Day: Daily  Current Treatment Recommendations: Strengthening,Balance training,Functional mobility training,Endurance training,Pain management,Gait training,Safety education & training,Patient/Caregiver education & training,Equipment evaluation, education, & procurement,Home management training,Self-Care / ADL,Positioning     Restrictions  Restrictions/Precautions  Restrictions/Precautions: Weight Bearing,Fall Risk  Lower Extremity Weight Bearing Restrictions  Right Lower Extremity Weight Bearing: Weight Bearing As Tolerated  Position Activity Restriction  Other position/activity restrictions: 2L O2 NC    Subjective   General  Chart Reviewed: Yes,Orders,Progress Notes,History and Physical,Previous Admission,Labs  Patient assessed for rehabilitation services?: Yes  Family / Caregiver Present: No  Referring Practitioner:  Burke Santacruz DO  Diagnosis: Closed comminuted intertrochanteric fracture of proximal end of right femur, initial encounter ([facility]  Subjective  Subjective: Pt in bed and agreebable to therapy evaluation  General Comment  Comments: RN cleared pt for therapy evaluation and reporting pt received pain meds prior to therapy staff entry  Pain: Pt reports 6/10 pain at R hip  Social/Functional History  Social/Functional History  Lives With: Son  Type of Home: [facility]  Home Layout: One level  Home Access: Stairs to enter with rails  Entrance Stairs - Number of Steps: 4  Entrance Stairs - Rails: Left  Bathroom Shower/Tub: Walk-in shower,Shower chair with back  Bathroom Toilet: Handicap height  Bathroom Equipment: Grab bars in shower,Grab bars around toilet  Home Equipment:  (No DME)  Has the patient had two or more falls in the past year or any fall with injury in the past year?: Yes (Pt reports only fall is one leading to admission)  ADL Assistance: [facility] [address]: Independent  Homemaking Responsibilities: Yes  Meal Prep Responsibility: Primary  Ambulation Assistance: Independent (Without AD)  Transfer Assistance: Independent  Active : Yes  Type of Occupation:  1 day per week  Additional Comments: Pt's son works from exercises  Short Term Goal 4: Pt will perform 2-3 grooming tasks in stance with LRAD  Short Term Goal 5: Pt will tolerate standing for x5 mins in preparation for standing ADLs  Patient Goals   Patient goals : \"I want to be able to get to the toilet\"       Therapy Time   Individual Concurrent Group Co-treatment   Time In 1538         Time Out 1608         Minutes 30         Timed Code Treatment Minutes: 30 Minutes     If pt is unable to be seen after this session, please let this note serve as discharge summary. Please see case management note for discharge disposition. Thank you.     Robbin Weller, OT

## 2022-06-20 LAB
HCT VFR BLD CALC: 35.7 % (ref 36–48)
HEMOGLOBIN: 11.8 G/DL (ref 12–16)
MCH RBC QN AUTO: 31.8 PG (ref 26–34)
MCHC RBC AUTO-ENTMCNC: 33.1 G/DL (ref 31–36)
MCV RBC AUTO: 96 FL (ref 80–100)
PDW BLD-RTO: 14.8 % (ref 12.4–15.4)
PLATELET # BLD: 171 K/UL (ref 135–450)
PMV BLD AUTO: 8.9 FL (ref 5–10.5)
RBC # BLD: 3.72 M/UL (ref 4–5.2)
WBC # BLD: 10 K/UL (ref 4–11)

## 2022-06-20 PROCEDURE — APPNB45 APP NON BILLABLE 31-45 MINUTES: Performed by: SPECIALIST/TECHNOLOGIST

## 2022-06-20 PROCEDURE — 6370000000 HC RX 637 (ALT 250 FOR IP): Performed by: STUDENT IN AN ORGANIZED HEALTH CARE EDUCATION/TRAINING PROGRAM

## 2022-06-20 PROCEDURE — 97110 THERAPEUTIC EXERCISES: CPT

## 2022-06-20 PROCEDURE — 6370000000 HC RX 637 (ALT 250 FOR IP): Performed by: SPECIALIST/TECHNOLOGIST

## 2022-06-20 PROCEDURE — 97116 GAIT TRAINING THERAPY: CPT

## 2022-06-20 PROCEDURE — 1200000000 HC SEMI PRIVATE

## 2022-06-20 PROCEDURE — 99024 POSTOP FOLLOW-UP VISIT: CPT | Performed by: SPECIALIST/TECHNOLOGIST

## 2022-06-20 PROCEDURE — 6370000000 HC RX 637 (ALT 250 FOR IP): Performed by: INTERNAL MEDICINE

## 2022-06-20 PROCEDURE — 97530 THERAPEUTIC ACTIVITIES: CPT

## 2022-06-20 PROCEDURE — 36415 COLL VENOUS BLD VENIPUNCTURE: CPT

## 2022-06-20 PROCEDURE — 97535 SELF CARE MNGMENT TRAINING: CPT

## 2022-06-20 PROCEDURE — 85027 COMPLETE CBC AUTOMATED: CPT

## 2022-06-20 PROCEDURE — 6360000002 HC RX W HCPCS: Performed by: STUDENT IN AN ORGANIZED HEALTH CARE EDUCATION/TRAINING PROGRAM

## 2022-06-20 RX ORDER — METHOCARBAMOL 500 MG/1
500 TABLET, FILM COATED ORAL 3 TIMES DAILY
Status: DISCONTINUED | OUTPATIENT
Start: 2022-06-20 | End: 2022-06-25 | Stop reason: HOSPADM

## 2022-06-20 RX ORDER — ACETAMINOPHEN 325 MG/1
650 TABLET ORAL EVERY 6 HOURS
Status: DISCONTINUED | OUTPATIENT
Start: 2022-06-20 | End: 2022-06-25 | Stop reason: HOSPADM

## 2022-06-20 RX ORDER — NICOTINE 21 MG/24HR
1 PATCH, TRANSDERMAL 24 HOURS TRANSDERMAL DAILY
Status: DISCONTINUED | OUTPATIENT
Start: 2022-06-20 | End: 2022-06-20

## 2022-06-20 RX ADMIN — OXYCODONE 10 MG: 5 TABLET ORAL at 20:00

## 2022-06-20 RX ADMIN — METHOCARBAMOL TABLETS 500 MG: 750 TABLET, COATED ORAL at 14:50

## 2022-06-20 RX ADMIN — ACETAMINOPHEN 650 MG: 325 TABLET ORAL at 23:07

## 2022-06-20 RX ADMIN — POLYETHYLENE GLYCOL 3350 17 G: 17 POWDER, FOR SOLUTION ORAL at 08:57

## 2022-06-20 RX ADMIN — METHOCARBAMOL TABLETS 500 MG: 750 TABLET, COATED ORAL at 10:34

## 2022-06-20 RX ADMIN — CEFAZOLIN 2000 MG: 10 INJECTION, POWDER, FOR SOLUTION INTRAVENOUS at 08:55

## 2022-06-20 RX ADMIN — ACETAMINOPHEN 650 MG: 325 TABLET ORAL at 17:39

## 2022-06-20 RX ADMIN — AMLODIPINE BESYLATE 5 MG: 5 TABLET ORAL at 08:55

## 2022-06-20 RX ADMIN — ACETAMINOPHEN 650 MG: 325 TABLET ORAL at 10:31

## 2022-06-20 RX ADMIN — OXYCODONE 10 MG: 5 TABLET ORAL at 08:55

## 2022-06-20 RX ADMIN — METHOCARBAMOL TABLETS 500 MG: 750 TABLET, COATED ORAL at 20:00

## 2022-06-20 RX ADMIN — ESCITALOPRAM OXALATE 20 MG: 10 TABLET ORAL at 08:55

## 2022-06-20 ASSESSMENT — PAIN SCALES - GENERAL
PAINLEVEL_OUTOF10: 5
PAINLEVEL_OUTOF10: 6
PAINLEVEL_OUTOF10: 7
PAINLEVEL_OUTOF10: 8
PAINLEVEL_OUTOF10: 7
PAINLEVEL_OUTOF10: 7
PAINLEVEL_OUTOF10: 6

## 2022-06-20 ASSESSMENT — PAIN DESCRIPTION - LOCATION
LOCATION: HIP
LOCATION: HIP;MOUTH

## 2022-06-20 NOTE — PLAN OF CARE
Bed in lowest position, wheels locked, 2/4 side rails up, nonskid footwear on. Bed/ chair check alarm in place, call light within reach. Pt instructed to call out when needing assistance. Pt stated understanding. Nurse will continue to monitor.      Problem: Safety - Adult  Goal: Free from fall injury  Outcome: Progressing

## 2022-06-20 NOTE — CARE COORDINATION
CASE MANAGEMENT INITIAL ASSESSMENT      Reviewed chart and completed assessment with patient: Pt   Family present: None   Explained Case Management role/services. Yes     Primary contact information: Pt's son Cheikh Rodrigues :   Primary Decision Maker: Michelle Day - [phone]          Admit date/status: 6/18/22   Diagnosis: Femur fracture   Is this a Readmission?: No    Insurance: [employer] For [employer] required for SNF: Y       3 night stay required: N    Living arrangements, Adls, care needs, prior to admission: Pt lives at home alone normally but states he son has been staying with her. Normally independent     Durable Medical Equipment at home:  Walker__Cane__RTS__ BSC__Shower Chair__  02__ HHN__ CPAP__  BiPap__  Hospital Bed__ W/C___ Other_None____    Services in the home and/or outpatient, prior to admission: None     Transportation needs:  Pt's states family will transport     Dialysis Facility (if applicable) n/a   · Name:  · Address:  · Dialysis Schedule:  · Phone:  · Fax:    PT/OT recs: [facility] Exemption Notification (HEN): Needed     Barriers to discharge: None     Plan/comments: 6/20/22 Pt from home,  Would like to go home with [facility] if she can with referral to [facility].  Will follow for PT/OT     ECOC on chart for MD signature

## 2022-06-20 NOTE — PROGRESS NOTES
bilateral SCD sleeves applied  Thigh moderately swollen, compartments soft and compressible  Non-tender to palpation along the lateral thigh, some tenderness to anterior thigh  mepilex dressings CDI  Sensation intact to light touch throughout entirety of RLE  Ehl, fhl, gastroc, ant tib motor intact  DP and PT pulses 2+, feet WWP          Data Review  CBC:   Lab Results   Component Value Date    WBC 10.7 06/18/2022    RBC 4.44 06/18/2022    HGB 14.1 06/18/2022    HCT 42.6 06/18/2022     06/18/2022       Renal:   Lab Results   Component Value Date     06/18/2022    K 4.0 06/18/2022     06/18/2022    CO2 20 06/18/2022    BUN 21 06/18/2022    CREATININE 0.8 06/18/2022    GLUCOSE 104 06/18/2022    CALCIUM 9.8 06/18/2022            Assessment:     S/p right hip IM nail, postop day 1. Date of surgery 6/19/2022 with Dr. Palacios Franky:      1: Weightbearing as tolerated right lower extremity with assistive device  2:  Continue Deep venous thrombosis prophylaxis-Lovenox 40 mg daily for 4 weeks postoperatively  3:  Continue Pain Control-scheduled Tylenol, Robaxin. Oxycodone as needed  4: PT/OT eval completed, continue to assess pending progress  5: 2 doses IV Ancef completed postoperatively  6: likely Discharge tomorrow. Follow-up with Dr. Je Prakash 2 weeks postoperatively. DCP updated.  Med rec not completed    Giorgio Gupta

## 2022-06-20 NOTE — PROGRESS NOTES
Occupational Therapy  Facility/Department: [facility] C5 - MED SURG/ORTHO  Daily Treatment Note  NAME: Mary Anna  : 1945  MRN: [id number]    Date of Service: 2022    Discharge Recommendations:  [address]         Patient Diagnosis(es): The primary encounter diagnosis was Closed intertrochanteric fracture of hip, right, initial encounter ([address] A diagnosis of Fall, initial encounter was also pertinent to this visit. Assessment    Assessment: Patient min A of 2 for safe transfers and mobility this date with RW, cues for safe hand placement and walker sequecing, maxA for LE dressing. Co-tx collaboration this date to safely meet goals and will have better occupational performance outcomes with in a co-treatment than 1:1 session. Pt continues to function below baseline and would benefit from SNF at discharge as pt son works as will not be able to provide assist at home. Activity Tolerance: Patient limited by fatigue;Patient limited by pain; Patient limited by endurance;Treatment limited secondary to medical complications  Discharge Recommendations: [address]  Times per Week: 4-6x's a week while in acute care     Restrictions  Restrictions/Precautions  Restrictions/Precautions: Weight Bearing; Fall Risk  Lower Extremity Weight Bearing Restrictions  Right Lower Extremity Weight Bearing: Weight Bearing As Tolerated    Subjective   Subjective  Subjective: Pt pleasant and agreeable to OT session, states would like to use the bathroom. Orientation  Overall Orientation Status: Within Normal Limits  Pain: 7.5/10  Cognition  Overall Cognitive Status: Exceptions  Arousal/Alertness: Appropriate responses to stimuli  Following Commands: Follows one step commands with repetition; Follows multistep commands with increased time; Follows multistep commands with repitition  Attention Span: Appears intact  Memory: Appears intact  Safety Judgement: Decreased stance with LRAD  Short Term Goal 5: Pt will tolerate standing for x5 mins in preparation for standing ADLs  Patient Goals   Patient goals : \"I want to be able to get to the toilet\"       Therapy Time   Individual Concurrent Group Co-treatment   Time In 1100         Time Out 1140         Minutes 40         Timed Code Treatment Minutes: 40 Minutes       Smith Castillo, OTR/L  If pt is unable to be seen after this session, please let this note serve as discharge summary. Please see case management note for discharge disposition. Thank you.

## 2022-06-20 NOTE — DISCHARGE INSTR - COC
Continuity of Care Form    Patient Name: Vesta Villasenor   :  1945  MRN:  [id number]    Admit date:  2022  Discharge date:  ***    Code Status Order: Full Code   Advance Directives:      Admitting Physician:  Venice Morris MD  PCP: No primary care provider on file. Discharging Nurse: [facility] Unit/Room#: 6485/4987-74  Discharging Unit Phone Number: ***    Emergency Contact:   Extended Emergency Contact Information  Primary Emergency Contact: [address], [address] Phone: [phone]  Relation: Other  Secondary Emergency Contact: Michael Narvaez  Mobile Phone: [phone]  Relation: Child  Preferred language: English   needed?  No    Past Surgical History:  Past Surgical History:   Procedure Laterality Date    HIP FRACTURE SURGERY Right     HIP SURGERY Right 2022    RIGHT HIP IM NAIL VIOLET INSERTION performed by Екатерина Henson DO at [address], PARTIAL      MASTECTOMY, BILATERAL         Immunization History:   Immunization History   Administered Date(s) Administered    COVID-19, Pfizer Purple top, DILUTE for use, 12+ yrs, 30mcg/0.3mL dose 2021, 2021, 10/04/2021       Active Problems:  Patient Active Problem List   Diagnosis Code    Closed comminuted intertrochanteric fracture of proximal end of right femur, initial encounter ([facility] S72.141A    Essential hypertension I10    Generalized anxiety disorder F41.1    Malignant neoplasm of lung ([facility] C34.90    Tobacco use disorder F17.200       Isolation/Infection:   Isolation            No Isolation          Patient Infection Status       None to display            Nurse Assessment:  Last Vital Signs: /75   Pulse 100   Temp 98.4 °F (36.9 °C) (Oral)   Resp 16   Ht 5' 2\" (1.575 m)   Wt 150 lb 5.7 oz (68.2 kg)   SpO2 91%   BMI 27.50 kg/m²     Last documented pain score (0-10 scale): Pain Level: 6  Last Weight:   Wt Readings from Last 1 Encounters:   22 150 lb 5.7 oz (68.2 kg)     Mental Status:  oriented and alert    IV Access:  - None    Nursing Mobility/ADLs:  Walking   Assisted  Transfer  Assisted  Bathing  Assisted  Dressing  Assisted  Toileting  Assisted  Feeding  Independent  Med [address]  Med Delivery   whole    Wound Care Documentation and Therapy:  Incision 06/19/22 Hip Anterior;Right (Active)   Dressing Status Clean;Dry; Intact 06/20/22 0738   Dressing/Treatment Foam 06/20/22 0738   Drainage Amount None 06/20/22 0738   Odor None 06/20/22 0738   Number of days: 1        Elimination:  Continence: Bowel: Yes  Bladder: Yes  Urinary Catheter: None   Colostomy/Ileostomy/Ileal Conduit: No       Date of Last BM: 6/25/22    Intake/Output Summary (Last 24 hours) at 6/20/2022 1321  Last data filed at 6/20/2022 1240  Gross per 24 hour   Intake 120 ml   Output 2150 ml   Net -2030 ml     I/O last 3 completed shifts: In: 1431.7 [I.V.:1331.7; IV Piggyback:100]  Out: 3000 [Urine:2850; Blood:150]    Safety Concerns: At Risk for Falls    Impairments/Disabilities:      Vision    Nutrition Therapy:  Current Nutrition Therapy:   - Oral Diet:  General    Routes of Feeding: Oral  Liquids: No Restrictions  Daily Fluid Restriction: no  Last Modified Barium Swallow with Video (Video Swallowing Test): not done    Treatments at the Time of Hospital Discharge:   Respiratory Treatments:   Oxygen Therapy:  is not on home oxygen therapy.   Ventilator:    - No ventilator support    Rehab Therapies: Physical Therapy and Occupational Therapy  Weight Bearing Status/Restrictions: No weight bearing restrictions  Other Medical Equipment (for information only, NOT a DME order):  walker  Other Treatments:     Patient's personal belongings (please select all that are sent with patient):  Glasses, Jewelry, earrings, suitcase, toiletries, phone, phone   , purse, hydrocortisone cream,  orajel, clothes, shoes, flowers, inhaler, sore throat spray    RN SIGNATURE:  Electronically signed by Ken Franco RN on 6/25/22 at 3:18 PM EDT    CASE MANAGEMENT/SOCIAL WORK SECTION    Inpatient Status Date: 06/18/2022    Readmission Risk Assessment Score:  Readmission Risk              Risk of Unplanned Readmission:  13           Discharging to Facility/ Agency   [address]   [address]   [facility] [address]   [phone]    / signature: Electronically signed by Heather Nunes RN on 6/25/22 at 3:04 PM EDT    PHYSICIAN SECTION    Prognosis: Good    Condition at Discharge: Stable    Rehab Potential (if transferring to Rehab): Good    Recommended Follow-up, Labs or Other Treatments After Discharge:    PT/OT- WBAT to the right lower extremity with assistive device   Lovenox 40mg daily as DVT prophylaxis for 30 days postoperatively  Follow-up with Dr. Whit Espinoza in 2 weeks postoperatively. Call [facility] orthopedics at [phone] to schedule an appointment or with any questions. Physician Certification: I certify the above information and transfer of Rupert Hodge  is necessary for the continuing treatment of the diagnosis listed and that she requires [facility] for less 30 days.      Update Admission H&P: No change in H&P    PHYSICIAN SIGNATURE:  Electronically signed by William Rodríguez MD on 6/25/22 at 1:59 PM EDT

## 2022-06-20 NOTE — CARE COORDINATION
[facility]    Referral received from  to follow for home care services. I will follow for needs, and speak with patient to verify demos. I have submitted for approval, and will update when I'm able.         Emmie Joel RN, BSN CTN  [facility] [phone]

## 2022-06-20 NOTE — PROGRESS NOTES
Hospitalist Progress Note      PCP: No primary care provider on file. Date of Admission: 6/18/2022    Chief Complaint: Fall, right hip pain    Hospital Course:   78 y.o. female who presented to [facility] with above complaints  Patient with PMH of lung CA s/p resection, currently in remission, Hx of breast cancer s/p surgery, tobacco abuse, HTN, COPD presenting to the ED today with complaints of fall. Patient reports earlier today she was getting out of her car when she lost balance, tripped and fell on her right side with the brunt of the impact on her right hip. After the fall she had severe right hip and right groin pain due to which she was unable to stand or ambulate. No head trauma or loss of consciousness. No chest pain. Denies any neck pain or headache. Patient apparently had been drinking prior to the fall, she consumed 1 drink. Patient reports she is very active physically. Is able to walk a few blocks, climb a couple of flights of stairs easily without any symptoms. Subjective: pain well controlled. Working with PT. No new complaints.     Medications:  Reviewed    Infusion Medications    sodium chloride      sodium chloride 50 mL/hr at 06/19/22 1118     Scheduled Medications    acetaminophen  650 mg Oral Q6H    methocarbamol  500 mg Oral TID    nicotine  1 patch TransDERmal Daily    polyethylene glycol  17 g Oral Daily    sodium chloride flush  5-40 mL IntraVENous 2 times per day    escitalopram  20 mg Oral Daily    amLODIPine  5 mg Oral Daily    [Held by provider] losartan  100 mg Oral Daily     PRN Meds: albuterol sulfate HFA **AND** ipratropium, sodium chloride flush, sodium chloride, ondansetron **OR** ondansetron, oxyCODONE **OR** oxyCODONE      Intake/Output Summary (Last 24 hours) at 6/20/2022 1129  Last data filed at 6/20/2022 1032  Gross per 24 hour   Intake --   Output 2150 ml   Net -2150 ml       Physical Exam Performed:  /78   Pulse 100   Temp 98.4 °F (36.9 °C) (Oral)   Resp 16   Ht 5' 2\" (1.575 m)   Wt 150 lb 5.7 oz (68.2 kg)   SpO2 91%   BMI 27.50 kg/m²     General appearance: No apparent distress, appears stated age and cooperative. HEENT: Pupils equal, round, and reactive to light. Conjunctivae/corneas clear. Neck: Supple, with full range of motion. No jugular venous distention. Trachea midline. Respiratory:  Normal respiratory effort. Clear to auscultation, bilaterally without Rales/Wheezes/Rhonchi. Cardiovascular: Regular rate and rhythm with normal S1/S2 without murmurs, rubs or gallops. Abdomen: Soft, non-tender, non-distended with normal bowel sounds. Musculoskeletal: No clubbing, cyanosis or edema bilaterally. S/p Right Hip IM nail fixation   Skin: Skin color, texture, turgor normal.  No rashes or lesions. Neurologic:  Neurovascularly intact without any focal sensory/motor deficits. Cranial nerves: II-XII intact, grossly non-focal.  Psychiatric: Alert and oriented, thought content appropriate, normal insight  Capillary Refill: Brisk,< 3 seconds   Peripheral Pulses: +2 palpable, equal bilaterally       Labs:   Recent Labs     06/18/22 1940 06/20/22  1009   WBC 10.7 10.0   HGB 14.1 11.8*   HCT 42.6 35.7*    171     Recent Labs     06/18/22 1940      K 4.0      CO2 20*   BUN 21*   CREATININE 0.8   CALCIUM 9.8     Recent Labs     06/18/22 1940   AST 15   ALT 13   BILITOT <0.2   ALKPHOS 123     Recent Labs     06/18/22 1940   INR 1.01     Radiology:  XR HIP 2-3 VW W PELVIS RIGHT   Final Result   Anatomic alignment status post ORIF right femoral neck         XR HIP RIGHT (2-3 VIEWS)   Final Result   Successful reduction of right femoral intertrochanteric fracture with   intramedullary nail system.          FLUORO FOR SURGICAL PROCEDURES   Final Result      XR FEMUR RIGHT (MIN 2 VIEWS)   Preliminary Result   Stable acute comminuted displaced intertrochanteric fracture of the proximal   right femur, similar to the study earlier today.  No additional femoral   fracture is identified. CT Head WO Contrast   Final Result   Chronic findings in the brain without acute CT abnormality identified. XR CHEST PORTABLE   Final Result   No acute cardiopulmonary abnormality. XR HIP RIGHT (2-3 VIEWS)   Final Result   Mildly displaced and angulated intertrochanteric right femur fracture. Active Hospital Problems    Diagnosis Date Noted    Closed comminuted intertrochanteric fracture of proximal end of right femur, initial encounter ([facility] Berny Siddharthor 06/18/2022     Priority: Medium    Tobacco use disorder [F17.200] 07/26/2016     Priority: Medium    Generalized anxiety disorder [F41.1] 07/26/2016     Priority: Medium    Essential hypertension [I10] 07/26/2016     Priority: Medium    Malignant neoplasm of lung ([facility] [C34.90] 03/05/2008     Priority: Medium     Assessment/Plan:  Right femur fracture  - orthopedic surgery consulted  - s/p OR for IM nail  - post-op management per surgery  - pain control ordered  - PT/OT    HTN  - well controlled  - continue norvasc. Restarting losartan    COPD  - no evidence of exacerbation  - continue home inhalers    H/O lung cancer, breast cancer  - s/p lobectomy, bilateral mastectomy  - follow up with hem/onc as outpatient    Anxiety  - mood stable  - continue lexapro    Tobacco dependence  - counseling given. Nicotine replacement ordered    DVT Prophylaxis: Per Ortho   Diet: ADULT DIET; Regular  Code Status: Full Code    PT/OT Eval Status:  Will need    Dispo - HHC vs SNF in 1-2 days    Stephanie Fernando MD

## 2022-06-20 NOTE — PROGRESS NOTES
Physical Therapy  Facility/Department: [facility] C5 - MED SURG/ORTHO  Daily Treatment Note  NAME: Mauro Delgado  : 1945  MRN: [id number]    Date of Service: 2022    Discharge Recommendations:  Subacute/Skilled Nursing Facility   PT Equipment Recommendations  Equipment Needed: No (defer to SNF)       AM-PAC Mobility Inpatient   How much difficulty turning over in bed?: A Little  How much difficulty sitting down on / standing up from a chair with arms?: A Lot  How much difficulty moving from lying on back to sitting on side of bed?: A Lot  How much help from another person moving to and from a bed to a chair?: A Little  How much help from another person needed to walk in hospital room?: A Lot  How much help from another person for climbing 3-5 steps with a railing?: Total  AM-PAC Inpatient Mobility Raw Score : 13  AM-PAC Inpatient T-Scale Score : 36.74  Mobility Inpatient CMS 0-100% Score: 64.91  Mobility Inpatient CMS G-Code Modifier : CL     Patient Diagnosis(es): The primary encounter diagnosis was Closed intertrochanteric fracture of hip, right, initial encounter ([facility] A diagnosis of Fall, initial encounter was also pertinent to this visit. Assessment   Assessment: Pt  able to particpate a bit more today as her BP remained stable. SHe was able to get to the EOB A X 1-2 and ambulated to and from the commode walking 15' then 25' with SW and A x1-2 plus verbal and tactile cues. Pt is recommended for con't skilled PT and SNF for D/C. Activity Tolerance: Patient limited by fatigue;Patient limited by pain; Patient limited by endurance;Treatment limited secondary to medical complications  Equipment Needed: No (defer to SNF)     Plan    Plan  Plan: 1 time a day 7 days a week  Specific Instructions for Next Treatment: Progress mobility as tolerated, assess t/f and gait if tolerated  Current Treatment Recommendations: Strengthening;Equipment evaluation, education, & procurement;Balance training;Gait training;Modalities;Stair training;Functional mobility training;Transfer training;Neuromuscular re-education;Home exercise program;Positioning; Safety education & training;Patient/Caregiver education & training; Therapeutic activities; Endurance training     Restrictions  Restrictions/Precautions  Restrictions/Precautions: Weight Bearing,Fall Risk  Lower Extremity Weight Bearing Restrictions  Right Lower Extremity Weight Bearing: Weight Bearing As Tolerated  Position Activity Restriction  Other position/activity restrictions: 2L O2 NC     Subjective    Subjective  Subjective: \"I want you to get me out of here. \"  Pain: 7.5/10  Orientation  Overall Orientation Status: Within Normal Limits     Objective   Vitals: /87 priot to amb 129/75 after amb  O2 91%   HR 95        Bed Mobility Training  Bed Mobility Training: Yes  Supine to Sit: Minimum assistance;Assist X2 (to R with HOB elevated)  Balance  Sitting: Intact  Standing: Impaired  Standing - Static: Constant support  Standing - Dynamic: Constant support  Transfer Training  Transfer Training: Yes  Sit to Stand: Minimum assistance;Assist X1;Assist X2  Stand to Sit: Minimum assistance  Toilet Transfer: Minimum assistance;Assist X1;Assist X2  Gait Training  Gait Training: Yes  Right Side Weight Bearing: As tolerated  Gait  Overall Level of Assistance: Minimum assistance  Interventions: Manual cues; Tactile cues; Verbal cues; Visual cues;Demonstration  Base of Support: Shift to left  Speed/Alda: Pace decreased (< 100 feet/min)  Gait Abnormalities: Decreased step clearance;Hip hike  Distance (ft): 15 Feet (+ 25')     PT Exercises  Exercise Treatment: BLE AP,QS, GS, heelslides in bed X 10 -15 reps  with A/tactile cues     Safety Devices  Type of Devices: Patient at risk for falls; All fall risk precautions in place; Left in bed;Call light within reach;Nurse notified;Gait belt;Bed alarm in place       Goals  Short Term Goals  Time Frame for Short term goals: 1 week 6/26/22 (unless otherwise specified)  Short term goal 1: Pt will complete supine to/from sit with I  Short term goal 2: Pt will complete sit to/from stand with LRAD with SBA  Short term goal 3: Pt will ambulate 48' with RW with SBA without LOB  Short term goal 4: Pt will ascend/descend 4 steps with HR with CGA without LOB  Short term goal 5: 6/23/22: Pt will participate in 12-15 reps BLE exercises to increase strength and increase I with functional mobility and gait  Patient Goals   Patient goals : \"Get stronger\"    Education  Patient Education  Education Given To: Patient  Education Provided: Role of Therapy;Plan of Care;Precautions;Transfer Training  Education Provided Comments: Educated in [employer] px, healing and progression of activity.  Pt educated on safe use of AD  Education Method: Demonstration;Verbal  Barriers to Learning: None  Education Outcome: Verbalized understanding;Demonstrated understanding    Therapy Time   Individual Concurrent Group Co-treatment   Time In 1100         Time Out 1140         Minutes [facility]

## 2022-06-21 PROCEDURE — 97110 THERAPEUTIC EXERCISES: CPT

## 2022-06-21 PROCEDURE — 6370000000 HC RX 637 (ALT 250 FOR IP): Performed by: STUDENT IN AN ORGANIZED HEALTH CARE EDUCATION/TRAINING PROGRAM

## 2022-06-21 PROCEDURE — 97535 SELF CARE MNGMENT TRAINING: CPT

## 2022-06-21 PROCEDURE — 97116 GAIT TRAINING THERAPY: CPT

## 2022-06-21 PROCEDURE — 97530 THERAPEUTIC ACTIVITIES: CPT

## 2022-06-21 PROCEDURE — 6360000002 HC RX W HCPCS: Performed by: SPECIALIST/TECHNOLOGIST

## 2022-06-21 PROCEDURE — 6370000000 HC RX 637 (ALT 250 FOR IP): Performed by: SPECIALIST/TECHNOLOGIST

## 2022-06-21 PROCEDURE — 99024 POSTOP FOLLOW-UP VISIT: CPT | Performed by: SPECIALIST/TECHNOLOGIST

## 2022-06-21 PROCEDURE — APPNB45 APP NON BILLABLE 31-45 MINUTES: Performed by: SPECIALIST/TECHNOLOGIST

## 2022-06-21 PROCEDURE — 6370000000 HC RX 637 (ALT 250 FOR IP): Performed by: INTERNAL MEDICINE

## 2022-06-21 PROCEDURE — 2580000003 HC RX 258: Performed by: STUDENT IN AN ORGANIZED HEALTH CARE EDUCATION/TRAINING PROGRAM

## 2022-06-21 PROCEDURE — 1200000000 HC SEMI PRIVATE

## 2022-06-21 RX ORDER — METHOCARBAMOL 500 MG/1
500 TABLET, FILM COATED ORAL 3 TIMES DAILY
Qty: 30 TABLET | Refills: 0
Start: 2022-06-21 | End: 2022-07-01

## 2022-06-21 RX ORDER — ENOXAPARIN SODIUM 100 MG/ML
40 INJECTION SUBCUTANEOUS DAILY
Qty: 12 ML | Refills: 0
Start: 2022-06-21 | End: 2022-07-21

## 2022-06-21 RX ORDER — ENOXAPARIN SODIUM 100 MG/ML
40 INJECTION SUBCUTANEOUS DAILY
Status: DISCONTINUED | OUTPATIENT
Start: 2022-06-21 | End: 2022-06-25 | Stop reason: HOSPADM

## 2022-06-21 RX ORDER — POLYETHYLENE GLYCOL 3350 17 G/17G
17 POWDER, FOR SOLUTION ORAL DAILY
Qty: 10 EACH | Refills: 0
Start: 2022-06-22 | End: 2022-07-02

## 2022-06-21 RX ORDER — OXYCODONE HYDROCHLORIDE 5 MG/1
5 TABLET ORAL EVERY 6 HOURS PRN
Qty: 12 TABLET | Refills: 0 | Status: SHIPPED | OUTPATIENT
Start: 2022-06-21 | End: 2022-06-24

## 2022-06-21 RX ADMIN — ACETAMINOPHEN 650 MG: 325 TABLET ORAL at 09:42

## 2022-06-21 RX ADMIN — SODIUM CHLORIDE, PRESERVATIVE FREE 10 ML: 5 INJECTION INTRAVENOUS at 09:42

## 2022-06-21 RX ADMIN — PHENOL 1 SPRAY: 1.5 LIQUID ORAL at 14:49

## 2022-06-21 RX ADMIN — AMLODIPINE BESYLATE 5 MG: 5 TABLET ORAL at 09:41

## 2022-06-21 RX ADMIN — LOSARTAN POTASSIUM 100 MG: 25 TABLET, FILM COATED ORAL at 09:42

## 2022-06-21 RX ADMIN — POLYETHYLENE GLYCOL 3350 17 G: 17 POWDER, FOR SOLUTION ORAL at 09:42

## 2022-06-21 RX ADMIN — BENZOCAINE: 0.1 GEL TOPICAL at 14:49

## 2022-06-21 RX ADMIN — SODIUM CHLORIDE, PRESERVATIVE FREE 10 ML: 5 INJECTION INTRAVENOUS at 20:30

## 2022-06-21 RX ADMIN — METHOCARBAMOL TABLETS 500 MG: 750 TABLET, COATED ORAL at 14:49

## 2022-06-21 RX ADMIN — OXYCODONE 10 MG: 5 TABLET ORAL at 23:20

## 2022-06-21 RX ADMIN — METHOCARBAMOL TABLETS 500 MG: 750 TABLET, COATED ORAL at 09:41

## 2022-06-21 RX ADMIN — ACETAMINOPHEN 650 MG: 325 TABLET ORAL at 23:20

## 2022-06-21 RX ADMIN — METHOCARBAMOL TABLETS 500 MG: 750 TABLET, COATED ORAL at 20:29

## 2022-06-21 RX ADMIN — ENOXAPARIN SODIUM 40 MG: 100 INJECTION SUBCUTANEOUS at 17:40

## 2022-06-21 RX ADMIN — ACETAMINOPHEN 650 MG: 325 TABLET ORAL at 04:28

## 2022-06-21 RX ADMIN — ESCITALOPRAM OXALATE 20 MG: 10 TABLET ORAL at 09:42

## 2022-06-21 RX ADMIN — ACETAMINOPHEN 650 MG: 325 TABLET ORAL at 17:40

## 2022-06-21 RX ADMIN — OXYCODONE 10 MG: 5 TABLET ORAL at 04:28

## 2022-06-21 ASSESSMENT — PAIN SCALES - GENERAL
PAINLEVEL_OUTOF10: 7
PAINLEVEL_OUTOF10: 6
PAINLEVEL_OUTOF10: 8
PAINLEVEL_OUTOF10: 7
PAINLEVEL_OUTOF10: 6
PAINLEVEL_OUTOF10: 7

## 2022-06-21 NOTE — PROGRESS NOTES
Department of Orthopedic Surgery  Physician Assistant   Progress Note    Subjective:       Systemic or Specific Complaints: pt notes her hip is doing well, she is having some pain with therapy but overall it is doing well with oral pain meds. States that her tongue is her biggest priority, she has a small blister at the tip of her tongue that is painful and she is unable to eat, concerned about thrush    Objective:     Patient Vitals for the past 24 hrs:   BP Temp Temp src Pulse Resp SpO2   06/21/22 0900 117/81 -- -- 91 -- 97 %   06/21/22 0739 (!) 145/98 98 °F (36.7 °C) Oral 87 18 93 %   06/21/22 0458 -- -- -- -- 17 --   06/20/22 2310 (!) 150/81 98.1 °F (36.7 °C) Oral 92 18 90 %   06/20/22 2004 (!) 147/86 98.2 °F (36.8 °C) Oral 89 17 90 %   06/20/22 1403 137/87 98.3 °F (36.8 °C) Oral 88 16 90 %       General: alert, appears stated age, cooperative and no distress   Wound: Wound clean and dry no evidence of infection. , No Erythema, No Drainage and Positive for Edema   Motion: Painful range of Motion in affected extremity   DVT Exam: No evidence of DVT seen on physical exam.  No cords or calf tenderness. No significant calf/ankle edema.      Additional exam: pt seen sitting up in chair at time of interview, bilateral SCD sleeves applied  Thigh moderately swollen, compartments soft and compressible  Non-tender to palpation along the lateral thigh, some tenderness to anterior thigh  mepilex dressings CDI  Sensation intact to light touch throughout entirety of RLE  Ehl, fhl, gastroc, ant tib motor intact  DP and PT pulses 2+, feet WWP          Data Review  CBC:   Lab Results   Component Value Date    WBC 10.0 06/20/2022    RBC 3.72 06/20/2022    HGB 11.8 06/20/2022    HCT 35.7 06/20/2022     06/20/2022       Renal:   Lab Results   Component Value Date     06/18/2022    K 4.0 06/18/2022     06/18/2022    CO2 20 06/18/2022    BUN 21 06/18/2022    CREATININE 0.8 06/18/2022    GLUCOSE 104 06/18/2022 CALCIUM 9.8 06/18/2022            Assessment:     S/p right hip IM nail, postop day 2. Date of surgery 6/19/2022 with Dr. Nguyen Bis:      1: Weightbearing as tolerated right lower extremity with assistive device  2:  Continue Deep venous thrombosis prophylaxis-Lovenox 40 mg daily for 4 weeks postoperatively  3:  Continue Pain Control-scheduled Tylenol, Robaxin. Oxycodone as needed  4: PT/OT eval completed, recommending SNF, CM following  5: 2 doses IV Ancef completed postoperatively  6: OK to DC from ortho standpoint pending placement. Follow-up with Dr. Jovita Huff 2 weeks postoperatively. DCP updated.      Giorgio Castillo

## 2022-06-21 NOTE — PROGRESS NOTES
Physical Therapy  Facility/Department: [place] [address] - MED SURG/ORTHO  Daily Treatment Note  NAME: Philip Beauchamp  : 1945  MRN: [id number]    Date of Service: 2022    Discharge Recommendations:  Subacute/Skilled Nursing Facility   PT Equipment Recommendations  Equipment Needed: No (defer to SNF)    Patient Diagnosis(es): The primary encounter diagnosis was Closed intertrochanteric fracture of hip, right, initial encounter ([facility] [address] A diagnosis of Fall, initial encounter was also pertinent to this visit. Assessment   Assessment: Pt agreeable and motivated to participate. Pt able to get OOB CGA, STS CGA/min A and amb with SW CGA/min A 15', 25' with max verbal cues for sequencing and technique. Pt not able to go home safely due to need for assistance. Pt is recommended for con't skilled PT and SNF at D/C. Activity Tolerance: Patient limited by fatigue;Patient limited by pain; Patient limited by endurance;Treatment limited secondary to medical complications  Equipment Needed: No (defer to SNF)     Plan    Plan  Plan: 1 time a day 7 days a week  Specific Instructions for Next Treatment: Progress mobility as tolerated, assess t/f and gait if tolerated  Current Treatment Recommendations: Strengthening;Equipment evaluation, education, & procurement;Balance training;Gait training;Modalities;Stair training;Functional mobility training;Transfer training;Neuromuscular re-education;Home exercise program;Positioning; Safety education & training;Patient/Caregiver education & training; Therapeutic activities; Endurance training     Restrictions  Restrictions/Precautions  Restrictions/Precautions: Weight Bearing,Fall Risk  Lower Extremity Weight Bearing Restrictions  Right Lower Extremity Weight Bearing: Weight Bearing As Tolerated  Position Activity Restriction  Other position/activity restrictions: 2L O2 NC     Subjective    Subjective  Subjective: \" I don't know how I'm going to do this, I need to go home\"  Pain: 7/10  Orientation  Overall Orientation Status: Within Normal Limits  Cognition  Overall Cognitive Status: Exceptions  Arousal/Alertness: Appropriate responses to stimuli  Following Commands: Follows all commands without difficulty; Follows multistep commands with increased time  Attention Span: Appears intact  Memory: Appears intact  Safety Judgement: Decreased awareness of need for safety;Decreased awareness of need for assistance  Insights: Decreased awareness of deficits  Initiation: Does not require cues  Sequencing: Requires cues for some     Objective   Vitals: /81, HR 91 and O2 97% RA       Bed Mobility Training  Bed Mobility Training: Yes   Supine to Sit: Contact-guard assistance (to R with OB elevated, use of rails and extra time)  Balance  Sitting: Intact  Standing: Impaired  Standing - Static: Occasional  Standing - Dynamic: Constant support  Transfer Training  Transfer Training: Yes  Sit to Stand: Contact-guard assistance;Minimum assistance  Stand to Sit: Contact-guard assistance;Minimum assistance  Toilet Transfer: Contact-guard assistance (B grab bars)     PT Exercises  Exercise Treatment: BLE AP,QS, GS, heelslides in bed X 10 -15 reps  with A/tactile cues seated : LAQ X 10 BLE     Safety Devices  Type of Devices: Left in chair;Call light within reach;Nurse notified;Gait belt; Chair alarm in place       Goals  Short Term Goals  Time Frame for Short term goals: 1 week 6/26/22 (unless otherwise specified)  Short term goal 1: Pt will complete supine to/from sit with I  Short term goal 2: Pt will complete sit to/from stand with LRAD with SBA  Short term goal 3: Pt will ambulate 48' with RW with SBA without LOB  Short term goal 4: Pt will ascend/descend 4 steps with HR with CGA without LOB  Short term goal 5: 6/23/22: Pt will participate in 12-15 reps BLE exercises to increase strength and increase I with functional mobility and gait  Patient Goals   Patient goals :  \"Get stronger\"    Education  Patient Education  Education Given To: Patient  Education Provided: Role of Therapy;Plan of Care;Precautions;Transfer Training  Education Provided Comments: Educated in [employer] px, healing and progression of activity.  Pt educated on safe use of AD  Education Method: Demonstration;Verbal  Barriers to Learning: None  Education Outcome: Verbalized understanding;Demonstrated understanding    Therapy Time   Individual Concurrent Group Co-treatment   Time In 0853         Time Out 0938         Minutes [address] [place] [address]

## 2022-06-21 NOTE — CARE COORDINATION
6/21/22 1425 Followed back up with pt and she and her son are still deciding on SNF choice.  She asked that we follow back up in am.

## 2022-06-21 NOTE — CARE COORDINATION
6/21/22 Pt given SNF list to review, would prefer home but understands SNF likely, will follow back up this afternoon. Pt wants time to review options.

## 2022-06-21 NOTE — PROGRESS NOTES
Hospitalist Progress Note      PCP: No primary care provider on file. Date of Admission: 6/18/2022    Chief Complaint: Fall, right hip pain    Hospital Course:   78 y.o. female who presented to [facility] with above complaints  Patient with PMH of lung CA s/p resection, currently in remission, Hx of breast cancer s/p surgery, tobacco abuse, HTN, COPD presenting to the ED today with complaints of fall. Patient reports earlier today she was getting out of her car when she lost balance, tripped and fell on her right side with the brunt of the impact on her right hip. After the fall she had severe right hip and right groin pain due to which she was unable to stand or ambulate. No head trauma or loss of consciousness. No chest pain. Denies any neck pain or headache. Patient apparently had been drinking prior to the fall, she consumed 1 drink. Patient reports she is very active physically. Is able to walk a few blocks, climb a couple of flights of stairs easily without any symptoms. Subjective: has small ulcer on tip of tongue that is very painful.      Medications:  Reviewed    Infusion Medications    sodium chloride      sodium chloride 50 mL/hr at 06/19/22 1118     Scheduled Medications    acetaminophen  650 mg Oral Q6H    methocarbamol  500 mg Oral TID    nicotine  1 patch TransDERmal Daily    polyethylene glycol  17 g Oral Daily    sodium chloride flush  5-40 mL IntraVENous 2 times per day    escitalopram  20 mg Oral Daily    amLODIPine  5 mg Oral Daily    losartan  100 mg Oral Daily     PRN Meds: albuterol sulfate HFA **AND** ipratropium, sodium chloride flush, sodium chloride, ondansetron **OR** ondansetron, oxyCODONE **OR** oxyCODONE      Intake/Output Summary (Last 24 hours) at 6/21/2022 1111  Last data filed at 6/21/2022 0928  Gross per 24 hour   Intake 240 ml   Output --   Net 240 ml       Physical Exam Performed:  /81   Pulse 91   Temp 98 °F (36.7 °C) (Oral)   Resp 18 Ht 5' 2\" (1.575 m)   Wt 150 lb 5.7 oz (68.2 kg)   SpO2 97%   BMI 27.50 kg/m²     General appearance: No apparent distress, appears stated age and cooperative. HEENT: Pupils equal, round, and reactive to light. Conjunctivae/corneas clear. Neck: Supple, with full range of motion. No jugular venous distention. Trachea midline. Respiratory:  Normal respiratory effort. Clear to auscultation, bilaterally without Rales/Wheezes/Rhonchi. Cardiovascular: Regular rate and rhythm with normal S1/S2 without murmurs, rubs or gallops. Abdomen: Soft, non-tender, non-distended with normal bowel sounds. Musculoskeletal: No clubbing, cyanosis or edema bilaterally. S/p Right Hip IM nail fixation   Skin: Skin color, texture, turgor normal.  No rashes or lesions. Neurologic:  Neurovascularly intact without any focal sensory/motor deficits. Cranial nerves: II-XII intact, grossly non-focal.  Psychiatric: Alert and oriented, thought content appropriate, normal insight  Capillary Refill: Brisk,< 3 seconds   Peripheral Pulses: +2 palpable, equal bilaterally       Labs:   Recent Labs     06/18/22 1940 06/20/22  1009   WBC 10.7 10.0   HGB 14.1 11.8*   HCT 42.6 35.7*    171     Recent Labs     06/18/22 1940      K 4.0      CO2 20*   BUN 21*   CREATININE 0.8   CALCIUM 9.8     Recent Labs     06/18/22 1940   AST 15   ALT 13   BILITOT <0.2   ALKPHOS 123     Recent Labs     06/18/22 1940   INR 1.01     Radiology:  XR HIP 2-3 VW W PELVIS RIGHT   Final Result   Anatomic alignment status post ORIF right femoral neck         XR HIP RIGHT (2-3 VIEWS)   Final Result   Successful reduction of right femoral intertrochanteric fracture with   intramedullary nail system. FLUORO FOR SURGICAL PROCEDURES   Final Result      XR FEMUR RIGHT (MIN 2 VIEWS)   Final Result   Stable acute comminuted displaced intertrochanteric fracture of the proximal   right femur, similar to the study earlier today.   No additional femoral fracture is identified. CT Head WO Contrast   Final Result   Chronic findings in the brain without acute CT abnormality identified. XR CHEST PORTABLE   Final Result   No acute cardiopulmonary abnormality. XR HIP RIGHT (2-3 VIEWS)   Final Result   Mildly displaced and angulated intertrochanteric right femur fracture. Active Hospital Problems    Diagnosis Date Noted    Closed comminuted intertrochanteric fracture of proximal end of right femur, initial encounter ([facility] [address] Aly Alison 06/18/2022     Priority: Medium    Tobacco use disorder [F17.200] 07/26/2016     Priority: Medium    Generalized anxiety disorder [F41.1] 07/26/2016     Priority: Medium    Essential hypertension [I10] 07/26/2016     Priority: Medium    Malignant neoplasm of lung ([facility] [address] [C34.90] 03/05/2008     Priority: Medium     Assessment/Plan:  Right femur fracture  - orthopedic surgery consulted  - s/p OR for IM nail  - post-op management per surgery  - pain control ordered  - PT/OT    Tongue ulceration  - likely related to intubation  - topical pain control  - supportive care    HTN  - well controlled  - continue norvasc, losartan    COPD  - no evidence of exacerbation  - continue home inhalers    H/O lung cancer, breast cancer  - s/p lobectomy, bilateral mastectomy  - follow up with hem/onc as outpatient    Anxiety  - mood stable  - continue lexapro    Tobacco dependence  - counseling given. Nicotine replacement ordered    DVT Prophylaxis: Per Ortho   Diet: ADULT DIET; Regular  Code Status: Full Code    PT/OT Eval Status: Will need    Dispo - SNF once approved.  Medically ready for discharge    Laurie Beach MD

## 2022-06-21 NOTE — PROGRESS NOTES
Occupational Therapy  Facility/Department: [facility] C5 - MED SURG/ORTHO  Daily Treatment Note  NAME: Buffy Powers  : 1945  MRN: [id number]    Date of Service: 2022    Discharge Recommendations:  Subacute/Skilled Nursing Facility       AM-PAC score  AM-PAC Inpatient Daily Activity Raw Score: 17 (22 104)  AM-PAC Inpatient ADL T-Scale Score : 37.26 (22)  ADL Inpatient CMS 0-100% Score: 50.11 (22)  ADL Inpatient CMS G-Code Modifier : CK (22)      Patient Diagnosis(es): The primary encounter diagnosis was Closed intertrochanteric fracture of hip, right, initial encounter ([facility] [address] A diagnosis of Fall, initial encounter was also pertinent to this visit. Assessment    Assessment: Pt demos good progress toward OT goals, grossly CGA- min A with SW for functional transfers and mobility. Pt demos ability to stand for sink level grooming tasks with CGA. Pt demos need for mod A with LB dressing this session. Pt functioning below her baseline, does not have 24 hr A at home, continue to recommend SNF at d/c. Activity Tolerance: Patient tolerated treatment well  Discharge Recommendations: [address]  Times per Week: 4-6x's a week while in acute care     Restrictions  Restrictions/Precautions  Restrictions/Precautions: Weight Bearing; Fall Risk  Lower Extremity Weight Bearing Restrictions  Right Lower Extremity Weight Bearing: Weight Bearing As Tolerated  Position Activity Restriction  Other position/activity restrictions: . Subjective   Subjective  Subjective: Pt resting in bed, pleasant and agreeable to OT treatment. Pain: Pt reports 7/10 pain. Orientation  Overall Orientation Status: Within Normal Limits    Cognition  Overall Cognitive Status: Exceptions  Arousal/Alertness: Appropriate responses to stimuli  Following Commands: Follows all commands without difficulty; Follows multistep commands with increased time  Attention Span: Appears intact  Memory: Appears intact  Safety Judgement: Decreased awareness of need for safety;Decreased awareness of need for assistance  Insights: Decreased awareness of deficits  Initiation: Does not require cues  Sequencing: Requires cues for some        Objective    Vitals  Vitals  Heart Rate: 91  Heart Rate Source: Monitor  BP: 117/81  BP Location: Left upper arm  BP Method: Automatic  Patient Position: Semi fowlers  MAP (Calculated): 93  SpO2: 97 %  O2 Device: None (Room air)    Bed Mobility Training  Bed Mobility Training: Yes  Supine to Sit: Contact-guard assistance (to R with OB elevated, use of rails and extra time)    Balance  Sitting: Intact  Standing: Impaired  Standing - Static: Fair (SW)  Standing - Dynamic: Fair (SW)    Transfer Training  Transfer Training: Yes  Sit to Stand: Contact-guard assistance;Minimum assistance  Stand to Sit: Contact-guard assistance;Minimum assistance  Toilet Transfer: Contact-guard assistance (B grab bars)     ADL  Grooming: Setup;Stand by assistance  Grooming Skilled Clinical Factors: in stance at sink to brush teeth  LE Dressing: Moderate assistance  LE Dressing Skilled Clinical Factors: pull up briefs  Toileting: Minimal assistance        Safety Devices  Type of Devices: Left in chair;Chair alarm in place;Call light within reach;Nurse notified;Gait belt     Patient Education  Education Given To: Patient  Education Provided: Role of Therapy;Plan of Care;ADL Adaptive Strategies;Transfer Training;Equipment; Fall Prevention Strategies  Education Method: Demonstration;Verbal  Barriers to Learning: None  Education Outcome: Verbalized understanding;Demonstrated understanding;Continued education needed    Goals  Short Term Goals  Time Frame for Short term goals: 1 week (6/25) unless noted  Short Term Goal 1: Pt will be formally assessed for functional transfers-- GOAL MET, pt CGA-min A 6/21/22  Short Term Goal 2: Pt will perform bed mobility with supv  Short Term Goal 3: Pt will perform x15 BUE exercises  Short Term Goal 4: Pt will perform 2-3 grooming tasks in stance with LRAD  Short Term Goal 5: Pt will tolerate standing for x5 mins in preparation for standing ADLs  Patient Goals   Patient goals : \"I want to be able to get to the toilet\"       Therapy Time   Individual Concurrent Group Co-treatment   Time In 0855         Time Out 0939         Minutes [address], JOSEPH/L

## 2022-06-22 LAB
ANION GAP SERPL CALCULATED.3IONS-SCNC: 10 MMOL/L (ref 3–16)
BUN BLDV-MCNC: 10 MG/DL (ref 7–20)
CALCIUM SERPL-MCNC: 9.7 MG/DL (ref 8.3–10.6)
CHLORIDE BLD-SCNC: 103 MMOL/L (ref 99–110)
CO2: 27 MMOL/L (ref 21–32)
CREAT SERPL-MCNC: 0.6 MG/DL (ref 0.6–1.2)
GFR AFRICAN AMERICAN: >60
GFR NON-AFRICAN AMERICAN: >60
GLUCOSE BLD-MCNC: 127 MG/DL (ref 70–99)
HCT VFR BLD CALC: 36.4 % (ref 36–48)
HEMOGLOBIN: 12.1 G/DL (ref 12–16)
MCH RBC QN AUTO: 31.8 PG (ref 26–34)
MCHC RBC AUTO-ENTMCNC: 33.3 G/DL (ref 31–36)
MCV RBC AUTO: 95.5 FL (ref 80–100)
PDW BLD-RTO: 14.5 % (ref 12.4–15.4)
PLATELET # BLD: 197 K/UL (ref 135–450)
PMV BLD AUTO: 8.9 FL (ref 5–10.5)
POTASSIUM SERPL-SCNC: 3.8 MMOL/L (ref 3.5–5.1)
RBC # BLD: 3.82 M/UL (ref 4–5.2)
SARS-COV-2, NAAT: NOT DETECTED
SODIUM BLD-SCNC: 140 MMOL/L (ref 136–145)
WBC # BLD: 7.7 K/UL (ref 4–11)

## 2022-06-22 PROCEDURE — 6370000000 HC RX 637 (ALT 250 FOR IP): Performed by: STUDENT IN AN ORGANIZED HEALTH CARE EDUCATION/TRAINING PROGRAM

## 2022-06-22 PROCEDURE — 99024 POSTOP FOLLOW-UP VISIT: CPT | Performed by: SPECIALIST/TECHNOLOGIST

## 2022-06-22 PROCEDURE — 6370000000 HC RX 637 (ALT 250 FOR IP): Performed by: SPECIALIST/TECHNOLOGIST

## 2022-06-22 PROCEDURE — APPNB45 APP NON BILLABLE 31-45 MINUTES: Performed by: SPECIALIST/TECHNOLOGIST

## 2022-06-22 PROCEDURE — 2580000003 HC RX 258: Performed by: STUDENT IN AN ORGANIZED HEALTH CARE EDUCATION/TRAINING PROGRAM

## 2022-06-22 PROCEDURE — 97530 THERAPEUTIC ACTIVITIES: CPT

## 2022-06-22 PROCEDURE — 6370000000 HC RX 637 (ALT 250 FOR IP): Performed by: INTERNAL MEDICINE

## 2022-06-22 PROCEDURE — 97110 THERAPEUTIC EXERCISES: CPT

## 2022-06-22 PROCEDURE — 1200000000 HC SEMI PRIVATE

## 2022-06-22 PROCEDURE — 6360000002 HC RX W HCPCS: Performed by: SPECIALIST/TECHNOLOGIST

## 2022-06-22 PROCEDURE — 87635 SARS-COV-2 COVID-19 AMP PRB: CPT

## 2022-06-22 PROCEDURE — 97116 GAIT TRAINING THERAPY: CPT

## 2022-06-22 PROCEDURE — 36415 COLL VENOUS BLD VENIPUNCTURE: CPT

## 2022-06-22 PROCEDURE — 80048 BASIC METABOLIC PNL TOTAL CA: CPT

## 2022-06-22 PROCEDURE — 97535 SELF CARE MNGMENT TRAINING: CPT

## 2022-06-22 PROCEDURE — 85027 COMPLETE CBC AUTOMATED: CPT

## 2022-06-22 RX ORDER — POLYETHYLENE GLYCOL 3350 17 G/17G
17 POWDER, FOR SOLUTION ORAL 2 TIMES DAILY
Status: DISCONTINUED | OUTPATIENT
Start: 2022-06-22 | End: 2022-06-25 | Stop reason: HOSPADM

## 2022-06-22 RX ADMIN — ACETAMINOPHEN 650 MG: 325 TABLET ORAL at 23:16

## 2022-06-22 RX ADMIN — OXYCODONE 10 MG: 5 TABLET ORAL at 06:33

## 2022-06-22 RX ADMIN — ESCITALOPRAM OXALATE 20 MG: 10 TABLET ORAL at 09:36

## 2022-06-22 RX ADMIN — METHOCARBAMOL TABLETS 500 MG: 750 TABLET, COATED ORAL at 13:29

## 2022-06-22 RX ADMIN — SODIUM CHLORIDE, PRESERVATIVE FREE 10 ML: 5 INJECTION INTRAVENOUS at 20:53

## 2022-06-22 RX ADMIN — AMLODIPINE BESYLATE 5 MG: 5 TABLET ORAL at 09:36

## 2022-06-22 RX ADMIN — LOSARTAN POTASSIUM 100 MG: 25 TABLET, FILM COATED ORAL at 09:36

## 2022-06-22 RX ADMIN — ENOXAPARIN SODIUM 40 MG: 100 INJECTION SUBCUTANEOUS at 09:35

## 2022-06-22 RX ADMIN — METHOCARBAMOL TABLETS 500 MG: 750 TABLET, COATED ORAL at 09:37

## 2022-06-22 RX ADMIN — ACETAMINOPHEN 650 MG: 325 TABLET ORAL at 16:56

## 2022-06-22 RX ADMIN — ACETAMINOPHEN 650 MG: 325 TABLET ORAL at 10:53

## 2022-06-22 RX ADMIN — METHOCARBAMOL TABLETS 500 MG: 750 TABLET, COATED ORAL at 20:52

## 2022-06-22 RX ADMIN — POLYETHYLENE GLYCOL 3350 17 G: 17 POWDER, FOR SOLUTION ORAL at 09:35

## 2022-06-22 RX ADMIN — SODIUM CHLORIDE, PRESERVATIVE FREE 10 ML: 5 INJECTION INTRAVENOUS at 09:37

## 2022-06-22 RX ADMIN — POLYETHYLENE GLYCOL 3350 17 G: 17 POWDER, FOR SOLUTION ORAL at 20:52

## 2022-06-22 ASSESSMENT — PAIN SCALES - GENERAL
PAINLEVEL_OUTOF10: 3
PAINLEVEL_OUTOF10: 7
PAINLEVEL_OUTOF10: 5
PAINLEVEL_OUTOF10: 4
PAINLEVEL_OUTOF10: 5

## 2022-06-22 ASSESSMENT — PAIN DESCRIPTION - LOCATION: LOCATION: HIP

## 2022-06-22 ASSESSMENT — PAIN DESCRIPTION - ORIENTATION: ORIENTATION: RIGHT

## 2022-06-22 NOTE — PROGRESS NOTES
Pt A&Ox4. VSS. Shift assessment completed. Complains of constipation- miralax given. States it does not work. Tylenol given for pain per orders. Denies other needs. Call light within reach, bed in lowest position, wheels locked.

## 2022-06-22 NOTE — PROGRESS NOTES
Occupational Therapy  Facility/Department: [facility] C5 - MED SURG/ORTHO  Daily Treatment Note  NAME: Aury Jolly  : 1945  MRN: [id number]    Date of Service: 2022    Discharge Recommendations:  Subacute/Skilled Nursing Facility       AM-PAC score  AM-PAC Inpatient Daily Activity Raw Score: 17 (22 1524)  AM-PAC Inpatient ADL T-Scale Score : 37.26 (22 1524)  ADL Inpatient CMS 0-100% Score: 50.11 (22 1524)  ADL Inpatient CMS G-Code Modifier : CK (22 152)      Patient Diagnosis(es): The primary encounter diagnosis was Closed intertrochanteric fracture of hip, right, initial encounter ([facility] [address] A diagnosis of Fall, initial encounter was also pertinent to this visit. Assessment    Assessment: Pt continue to demo progress toward OT goals. Pt grossly CGA for functional transfers and mobility with SW this session. Writer provided visual demo of use of sock aid and reacher, pt able to return demo with min A and VCs to don socks and pants this session. Pt continues to function below her baseline without 24 hr A available at home. Continue to recommend SNF at d/c. Activity Tolerance: Patient tolerated treatment well  Discharge Recommendations: [address]  Times per Week: 4-6x's a week while in acute care     Restrictions  Restrictions/Precautions  Restrictions/Precautions: Weight Bearing; Fall Risk  Lower Extremity Weight Bearing Restrictions  Right Lower Extremity Weight Bearing: Weight Bearing As Tolerated    Subjective   Subjective  Subjective: Pt resting in bed, pleasant and agreeable to OT treatment. Pain: Pt reports pain improved from prior session, did not rate on scale.     Orientation  Overall Orientation Status: Within Normal Limits    Cognition  Safety Judgement: Decreased awareness of need for safety;Decreased awareness of need for assistance  Insights: Decreased awareness of deficits        Objective    Vitals  Vitals:    22 1504   BP: (!) 95/57   Pulse: 82   Resp: 16   Temp: 98.1 °F (36.7 °C)   SpO2: 92%       Bed Mobility Training  Bed Mobility Training: Yes  Supine to Sit: Contact-guard assistance; Additional time (to R with HOB elevated and bedrail removed to simulate home setup)    Balance  Sitting: Intact  Standing: Impaired (with SW, pt performed functional mobility in room and LB dressing donning pants over hips in stance)  Standing - Static: Good  Standing - Dynamic: Fair    Transfer Training  Transfer Training: Yes  Overall Level of Assistance: Contact-guard assistance  Interventions: Safety awareness training;Verbal cues  Sit to Stand: Contact-guard assistance  Stand to Sit: Contact-guard assistance       ADL  LE Dressing: Minimal assistance;Verbal cueing; Increased time to complete; Adaptive equipment  LE Dressing Skilled Clinical Factors: pants and socks with use of sock aid and reacher        Safety Devices  Type of Devices: Left in chair;Chair alarm in place;Call light within reach;Nurse notified;Gait belt     Patient Education  Education Given To: Patient  Education Provided: Role of Therapy;Plan of Care;ADL Adaptive Strategies;Transfer Training;Equipment; Fall Prevention Strategies  Education Method: Demonstration;Verbal  Barriers to Learning: None  Education Outcome: Verbalized understanding;Demonstrated understanding;Continued education needed    Goals  Short Term Goals  Time Frame for Short term goals: 1 week (6/25) unless noted  Short Term Goal 1: Pt will be formally assessed for functional transfers-- GOAL MET, pt CGA-min A 6/21/22  Short Term Goal 2: Pt will perform bed mobility with supv  Short Term Goal 3: Pt will perform x15 BUE exercises  Short Term Goal 4: Pt will perform 2-3 grooming tasks in stance with LRAD  Short Term Goal 5: Pt will tolerate standing for x5 mins in preparation for standing ADLs  Patient Goals   Patient goals : \"I want to be able to get to the toilet\"       Therapy Time   Individual Concurrent Group Co-treatment   Time In 1410         Time Out 1503         Minutes [address], JOSEPH/L

## 2022-06-22 NOTE — PROGRESS NOTES
Physical Therapy  Facility/Department: [facility] C5 - MED SURG/ORTHO  Daily Treatment Note  NAME: Tabatha Valentin  : 1945  MRN: [id number]    Date of Service: 2022    Discharge Recommendations:  Subacute/Skilled Nursing Facility   PT Equipment Recommendations  Equipment Needed: No (defer to SNF)     AM-PAC Mobility Inpatient   How much difficulty turning over in bed?: A Little  How much difficulty sitting down on / standing up from a chair with arms?: A Little  How much difficulty moving from lying on back to sitting on side of bed?: A Little  How much help from another person moving to and from a bed to a chair?: A Little  How much help from another person needed to walk in hospital room?: A Little  How much help from another person for climbing 3-5 steps with a railing?: A Little  AM-PAC Inpatient Mobility Raw Score : 18  AM-PAC Inpatient T-Scale Score : 43.63  Mobility Inpatient CMS 0-100% Score: 46.58  Mobility Inpatient CMS G-Code Modifier : CK    Patient Diagnosis(es): The primary encounter diagnosis was Closed intertrochanteric fracture of hip, right, initial encounter ([facility] [address] A diagnosis of Fall, initial encounter was also pertinent to this visit. Assessment   Assessment: Pt porgresssing well needing CGA for bed mobility, transfers and gait with walker up to 50'. Pt participated in RLE exs with Annalisa in supine X 15 reps. Pt lives alone and is unable to safely return to home needing this level of assist.  Pt is recommended for con't skilled PTand SNFat aD/C.   Activity Tolerance: Patient tolerated treatment well  Equipment Needed: No (defer to SNF)     Plan    Plan  Plan: 1 time a day 7 days a week  Specific Instructions for Next Treatment: Progress mobility as tolerated, assess t/f and gait if tolerated  Current Treatment Recommendations: Strengthening;Equipment evaluation, education, & procurement;Balance training;Gait training;Modalities;Stair training;Functional mobility training;Transfer training;Neuromuscular re-education;Home exercise program;Positioning; Safety education & training;Patient/Caregiver education & training; Therapeutic activities; Endurance training     Restrictions  Restrictions/Precautions  Restrictions/Precautions: Weight Bearing,Fall Risk  Lower Extremity Weight Bearing Restrictions  Right Lower Extremity Weight Bearing: Weight Bearing As Tolerated      Subjective    Subjective  Subjective: \" I know I'm going to need to go somewhere for rehab\"  Pain: 6/10  Orientation  Overall Orientation Status: Within Normal Limits  Cognition  Overall Cognitive Status: Exceptions  Arousal/Alertness: Appropriate responses to stimuli  Following Commands: Follows all commands without difficulty; Follows multistep commands with increased time  Attention Span: Appears intact  Memory: Appears intact  Safety Judgement: Decreased awareness of need for safety;Decreased awareness of need for assistance  Insights: Decreased awareness of deficits  Initiation: Does not require cues  Sequencing: Requires cues for some     Objective   Vitals:   /82  HR 95  O2 95%     Bed Mobility Training  Bed Mobility Training: Yes  Supine to Sit: Contact-guard assistance (to R with HOB elevated and use of rails, extra time required)  Balance  Sitting: Intact  Standing: Impaired  Standing - Static: Good (with walker)  Standing - Dynamic: Fair (with walker)  Transfer Training  Transfer Training: Yes  Overall Level of Assistance: Additional time  Sit to Stand: Contact-guard assistance  Stand to Sit: Contact-guard assistance  Gait Training  Gait Training: Yes  Right Side Weight Bearing: As tolerated  Gait  Overall Level of Assistance: Contact-guard assistance  Interventions: Manual cues; Tactile cues; Verbal cues; Visual cues;Demonstration  Base of Support: Shift to left  Speed/Alda: Slow  Gait Abnormalities: Decreased step clearance  Distance (ft): 50 Feet     PT Exercises  Exercise Treatment: AP,QS, GS, heelslides, abd and SAQ in bed with up to min A X 15 RLE     Safety Devices  Type of Devices: Left in chair;Chair alarm in place;Call light within reach;Nurse notified;Gait belt       Goals  Short Term Goals  Time Frame for Short term goals: 1 week 6/26/22 (unless otherwise specified)  Short term goal 1: Pt will complete supine to/from sit with I  Short term goal 2: Pt will complete sit to/from stand with LRAD with SBA  Short term goal 3: Pt will ambulate 48' with RW with SBA without LOB  Short term goal 4: Pt will ascend/descend 4 steps with HR with CGA without LOB  Short term goal 5: 6/23/22: Pt will participate in 12-15 reps BLE exercises to increase strength and increase I with functional mobility and gait  Patient Goals   Patient goals : \"Get stronger\"    Education  Patient Education  Education Given To: Patient  Education Provided: Role of Therapy;Plan of Care;Precautions;Transfer Training  Education Provided Comments: Educated in [employer] px, healing and progression of activity.  Pt educated on safe use of AD  Education Method: Demonstration;Verbal  Barriers to Learning: None  Education Outcome: Verbalized understanding;Demonstrated understanding    Therapy Time   Individual Concurrent Group Co-treatment   Time In 0900         Time Out 0940         Minutes [address] [facility], [address] [place] [address]

## 2022-06-22 NOTE — CARE COORDINATION
Spoke with Andreas at [facility]. [facility] able to accept Patient, precert started this date, will need new rapid COVID test for placement.

## 2022-06-22 NOTE — PROGRESS NOTES
Department of Orthopedic Surgery  Physician Assistant   Progress Note    Subjective:       Systemic or Specific Complaints: Hip pain well controlled. Tongue pain is improving. No family at bedside    Objective:     Patient Vitals for the past 24 hrs:   BP Temp Temp src Pulse Resp SpO2   06/22/22 0933 122/82 98 °F (36.7 °C) Oral 95 18 95 %   06/22/22 0930 -- 98 °F (36.7 °C) -- -- -- --   06/21/22 2350 -- -- -- -- 18 --   06/21/22 2326 135/86 98 °F (36.7 °C) Oral 90 16 92 %   06/21/22 2032 133/87 98.2 °F (36.8 °C) Oral 88 19 90 %   06/21/22 1451 138/81 98.2 °F (36.8 °C) Oral 84 18 92 %       General: alert, appears stated age, cooperative and no distress   Wound: Wound clean and dry no evidence of infection. , No Erythema, No Drainage and Positive for Edema   Motion: Painful range of Motion in affected extremity   DVT Exam: No evidence of DVT seen on physical exam.  No cords or calf tenderness. No significant calf/ankle edema. Additional exam: pt seen sitting up in chair at time of interview, bilateral SCD sleeves applied  Thigh moderately swollen, compartments firm but compressible  Non-tender to palpation along the lateral thigh, some tenderness to anterior thigh, improving  mepilex dressings CDI  Sensation intact to light touch throughout entirety of RLE  Ehl, fhl, gastroc, ant tib motor intact  DP and PT pulses 2+, feet WWP          Data Review  CBC:   Lab Results   Component Value Date    WBC 10.0 06/20/2022    RBC 3.72 06/20/2022    HGB 11.8 06/20/2022    HCT 35.7 06/20/2022     06/20/2022       Renal:   Lab Results   Component Value Date     06/18/2022    K 4.0 06/18/2022     06/18/2022    CO2 20 06/18/2022    BUN 21 06/18/2022    CREATININE 0.8 06/18/2022    GLUCOSE 104 06/18/2022    CALCIUM 9.8 06/18/2022            Assessment:     S/p right hip IM nail, postop day 3.   Date of surgery 6/19/2022 with Dr. Lucia Dominguez:      1: Weightbearing as tolerated right lower extremity with assistive device  2:  Continue Deep venous thrombosis prophylaxis-Lovenox 40 mg daily for 4 weeks postoperatively  3:  Continue Pain Control-scheduled Tylenol, Robaxin. Oxycodone as needed  4: PT/OT eval completed, recommending SNF, CM following  5: 2 doses IV Ancef completed postoperatively  6: OK to DC from ortho standpoint pending placement. Follow-up with Dr. Julian Victoria 2 weeks postoperatively. DCP updated.      Giorgio Underwood

## 2022-06-22 NOTE — PROGRESS NOTES
Hospitalist Progress Note      PCP: No primary care provider on file. Date of Admission: 6/18/2022    Chief Complaint: Fall, right hip pain    Hospital Course:   78 y.o. female who presented to [facility] with above complaints  Patient with PMH of lung CA s/p resection, currently in remission, Hx of breast cancer s/p surgery, tobacco abuse, HTN, COPD presenting to the ED today with complaints of fall. Patient reports earlier today she was getting out of her car when she lost balance, tripped and fell on her right side with the brunt of the impact on her right hip. After the fall she had severe right hip and right groin pain due to which she was unable to stand or ambulate. No head trauma or loss of consciousness. No chest pain. Denies any neck pain or headache. Patient apparently had been drinking prior to the fall, she consumed 1 drink. Patient reports she is very active physically. Is able to walk a few blocks, climb a couple of flights of stairs easily without any symptoms. Subjective: has small ulcer on tip of tongue that is very painful.      Medications:  Reviewed    Infusion Medications    sodium chloride       Scheduled Medications    polyethylene glycol  17 g Oral BID    enoxaparin  40 mg SubCUTAneous Daily    acetaminophen  650 mg Oral Q6H    methocarbamol  500 mg Oral TID    nicotine  1 patch TransDERmal Daily    sodium chloride flush  5-40 mL IntraVENous 2 times per day    escitalopram  20 mg Oral Daily    amLODIPine  5 mg Oral Daily    losartan  100 mg Oral Daily     PRN Meds: benzocaine, phenol, albuterol sulfate HFA **AND** ipratropium, sodium chloride flush, sodium chloride, ondansetron **OR** ondansetron, oxyCODONE **OR** oxyCODONE      Intake/Output Summary (Last 24 hours) at 6/22/2022 1605  Last data filed at 6/22/2022 0933  Gross per 24 hour   Intake 560 ml   Output --   Net 560 ml       Physical Exam Performed:  /84   Pulse 86   Temp 98.1 °F (36.7 °C) (Oral)   Resp 16   Ht 5' 2\" (1.575 m)   Wt 150 lb 5.7 oz (68.2 kg)   SpO2 92%   BMI 27.50 kg/m²     General appearance: No apparent distress, appears stated age and cooperative. HEENT: Pupils equal, round, and reactive to light. Conjunctivae/corneas clear. Neck: Supple, with full range of motion. No jugular venous distention. Trachea midline. Respiratory:  Normal respiratory effort. Clear to auscultation, bilaterally without Rales/Wheezes/Rhonchi. Cardiovascular: Regular rate and rhythm with normal S1/S2 without murmurs, rubs or gallops. Abdomen: Soft, non-tender, non-distended with normal bowel sounds. Musculoskeletal: No clubbing, cyanosis or edema bilaterally. S/p Right Hip IM nail fixation   Skin: Skin color, texture, turgor normal.  No rashes or lesions. Neurologic:  Neurovascularly intact without any focal sensory/motor deficits. Cranial nerves: II-XII intact, grossly non-focal.  Psychiatric: Alert and oriented, thought content appropriate, normal insight  Capillary Refill: Brisk,< 3 seconds   Peripheral Pulses: +2 palpable, equal bilaterally       Labs:   Recent Labs     06/20/22  1009 06/22/22  1009   WBC 10.0 7.7   HGB 11.8* 12.1   HCT 35.7* 36.4    197     Recent Labs     06/22/22  1009      K 3.8      CO2 27   BUN 10   CREATININE 0.6   CALCIUM 9.7     No results for input(s): AST, ALT, BILIDIR, BILITOT, ALKPHOS in the last 72 hours. No results for input(s): INR in the last 72 hours. Radiology:  XR HIP 2-3 VW W PELVIS RIGHT   Final Result   Anatomic alignment status post ORIF right femoral neck         XR HIP RIGHT (2-3 VIEWS)   Final Result   Successful reduction of right femoral intertrochanteric fracture with   intramedullary nail system. FLUORO FOR SURGICAL PROCEDURES   Final Result      XR FEMUR RIGHT (MIN 2 VIEWS)   Final Result   Stable acute comminuted displaced intertrochanteric fracture of the proximal   right femur, similar to the study earlier today.   No additional femoral   fracture is identified. CT Head WO Contrast   Final Result   Chronic findings in the brain without acute CT abnormality identified. XR CHEST PORTABLE   Final Result   No acute cardiopulmonary abnormality. XR HIP RIGHT (2-3 VIEWS)   Final Result   Mildly displaced and angulated intertrochanteric right femur fracture. Active Hospital Problems    Diagnosis Date Noted    Closed comminuted intertrochanteric fracture of proximal end of right femur, initial encounter ([facility] [address] Kelly Render 06/18/2022     Priority: Medium    Tobacco use disorder [F17.200] 07/26/2016     Priority: Medium    Generalized anxiety disorder [F41.1] 07/26/2016     Priority: Medium    Essential hypertension [I10] 07/26/2016     Priority: Medium    Malignant neoplasm of lung ([facility] [address] [C34.90] 03/05/2008     Priority: Medium     Assessment/Plan:  Right femur fracture  - orthopedic surgery consulted  - s/p OR for IM nail  - post-op management per surgery  - pain control ordered  - PT/OT    Tongue ulceration  - likely related to intubation  - topical pain control  - supportive care    HTN  - well controlled  - continue norvasc, losartan    COPD  - no evidence of exacerbation  - continue home inhalers    H/O lung cancer, breast cancer  - s/p lobectomy, bilateral mastectomy  - follow up with hem/onc as outpatient    Anxiety  - mood stable  - continue lexapro    Tobacco dependence  - counseling given. Nicotine replacement ordered    DVT Prophylaxis: Per Ortho   Diet: ADULT DIET; Regular  Code Status: Full Code    PT/OT Eval Status: Will need    Dispo - SNF pending precert.  Medically ready for discharge    William Rodríguez MD

## 2022-06-22 NOTE — CARE COORDINATION
Spoke to pt about DC plan- states she prefers home- no preference of agency. Referral to [facility]- they can accept for therapy but cannot staff nsg. If SNF needed- would choose [facility]. Call to facility- message left. Kateryna Denis RN     0670 Second call placed to [facility]. Kateryna Denis RN     9987 Another call to [facility]. If no response- will need second choice from pt. Therapy recs for SNF. Kateryna Denis RN     1300 Still no contact from [facility]- approached pt for second choice- names [facility]- not listed as in network w payer, but call to facility to check and message left. Pt again provided [facility] list for an alternate choice. States wants closest- informed her which were closest to her home. Kateryna Denis RN     4425 Received call from [facility]- requesting face sheet faxed to [phone] and they will review pt. If able to accept- they will begin cert.    Kateryna Denis RN

## 2022-06-22 NOTE — CARE COORDINATION
[facility]    Referral received from CM to follow for home care services. I will follow for needs, and speak with patient to verify demos.     Dc plan lovenox; needs teachable caregiver or patient nursing not available daily; patient needs instructed how to administer prior to discharge home    [address], BSN CTN  [facility] [phone]

## 2022-06-23 PROCEDURE — 6370000000 HC RX 637 (ALT 250 FOR IP): Performed by: STUDENT IN AN ORGANIZED HEALTH CARE EDUCATION/TRAINING PROGRAM

## 2022-06-23 PROCEDURE — 6370000000 HC RX 637 (ALT 250 FOR IP): Performed by: SPECIALIST/TECHNOLOGIST

## 2022-06-23 PROCEDURE — 97530 THERAPEUTIC ACTIVITIES: CPT

## 2022-06-23 PROCEDURE — 97110 THERAPEUTIC EXERCISES: CPT

## 2022-06-23 PROCEDURE — 1200000000 HC SEMI PRIVATE

## 2022-06-23 PROCEDURE — 6370000000 HC RX 637 (ALT 250 FOR IP): Performed by: INTERNAL MEDICINE

## 2022-06-23 PROCEDURE — 6370000000 HC RX 637 (ALT 250 FOR IP): Performed by: NURSE PRACTITIONER

## 2022-06-23 PROCEDURE — 97116 GAIT TRAINING THERAPY: CPT

## 2022-06-23 PROCEDURE — 2580000003 HC RX 258: Performed by: STUDENT IN AN ORGANIZED HEALTH CARE EDUCATION/TRAINING PROGRAM

## 2022-06-23 PROCEDURE — 97535 SELF CARE MNGMENT TRAINING: CPT

## 2022-06-23 PROCEDURE — 6360000002 HC RX W HCPCS: Performed by: SPECIALIST/TECHNOLOGIST

## 2022-06-23 RX ORDER — DIPHENHYDRAMINE HCL 25 MG
25 TABLET ORAL EVERY 6 HOURS PRN
Status: DISCONTINUED | OUTPATIENT
Start: 2022-06-23 | End: 2022-06-25 | Stop reason: HOSPADM

## 2022-06-23 RX ADMIN — DIPHENHYDRAMINE HCL 25 MG: 25 TABLET ORAL at 00:40

## 2022-06-23 RX ADMIN — OXYCODONE 5 MG: 5 TABLET ORAL at 11:49

## 2022-06-23 RX ADMIN — AMLODIPINE BESYLATE 5 MG: 5 TABLET ORAL at 09:52

## 2022-06-23 RX ADMIN — METHOCARBAMOL TABLETS 500 MG: 750 TABLET, COATED ORAL at 15:02

## 2022-06-23 RX ADMIN — OXYCODONE 5 MG: 5 TABLET ORAL at 06:22

## 2022-06-23 RX ADMIN — OXYCODONE 10 MG: 5 TABLET ORAL at 00:40

## 2022-06-23 RX ADMIN — SODIUM CHLORIDE, PRESERVATIVE FREE 10 ML: 5 INJECTION INTRAVENOUS at 09:52

## 2022-06-23 RX ADMIN — ESCITALOPRAM OXALATE 20 MG: 10 TABLET ORAL at 09:52

## 2022-06-23 RX ADMIN — ACETAMINOPHEN 650 MG: 325 TABLET ORAL at 09:52

## 2022-06-23 RX ADMIN — POLYETHYLENE GLYCOL 3350 17 G: 17 POWDER, FOR SOLUTION ORAL at 09:52

## 2022-06-23 RX ADMIN — SODIUM CHLORIDE, PRESERVATIVE FREE 10 ML: 5 INJECTION INTRAVENOUS at 21:03

## 2022-06-23 RX ADMIN — OXYCODONE 5 MG: 5 TABLET ORAL at 23:25

## 2022-06-23 RX ADMIN — METHOCARBAMOL TABLETS 500 MG: 750 TABLET, COATED ORAL at 09:53

## 2022-06-23 RX ADMIN — METHOCARBAMOL TABLETS 500 MG: 750 TABLET, COATED ORAL at 21:02

## 2022-06-23 RX ADMIN — LOSARTAN POTASSIUM 100 MG: 25 TABLET, FILM COATED ORAL at 09:53

## 2022-06-23 RX ADMIN — ENOXAPARIN SODIUM 40 MG: 100 INJECTION SUBCUTANEOUS at 09:52

## 2022-06-23 RX ADMIN — ACETAMINOPHEN 650 MG: 325 TABLET ORAL at 17:51

## 2022-06-23 RX ADMIN — ACETAMINOPHEN 650 MG: 325 TABLET ORAL at 23:25

## 2022-06-23 RX ADMIN — OXYCODONE 5 MG: 5 TABLET ORAL at 19:29

## 2022-06-23 RX ADMIN — POLYETHYLENE GLYCOL 3350 17 G: 17 POWDER, FOR SOLUTION ORAL at 21:02

## 2022-06-23 ASSESSMENT — PAIN SCALES - GENERAL
PAINLEVEL_OUTOF10: 6
PAINLEVEL_OUTOF10: 7
PAINLEVEL_OUTOF10: 4
PAINLEVEL_OUTOF10: 6
PAINLEVEL_OUTOF10: 8
PAINLEVEL_OUTOF10: 7
PAINLEVEL_OUTOF10: 5
PAINLEVEL_OUTOF10: 7
PAINLEVEL_OUTOF10: 6
PAINLEVEL_OUTOF10: 7
PAINLEVEL_OUTOF10: 4
PAINLEVEL_OUTOF10: 7
PAINLEVEL_OUTOF10: 5
PAINLEVEL_OUTOF10: 7

## 2022-06-23 ASSESSMENT — PAIN DESCRIPTION - LOCATION
LOCATION: HIP

## 2022-06-23 ASSESSMENT — PAIN DESCRIPTION - ORIENTATION
ORIENTATION: RIGHT

## 2022-06-23 ASSESSMENT — PAIN DESCRIPTION - DESCRIPTORS
DESCRIPTORS: ACHING
DESCRIPTORS: ACHING

## 2022-06-23 NOTE — PROGRESS NOTES
Occupational Therapy  Facility/Department: [facility] C5 - MED SURG/ORTHO  Daily Treatment Note  NAME: Bobbi Hinson  : 1945  MRN: [id number]    Date of Service: 2022    Discharge Recommendations:  Subacute/Skilled Nursing Facility       AM-PAC score  AM-PAC Inpatient Daily Activity Raw Score: 17 (22 1354)  AM-PAC Inpatient ADL T-Scale Score : 37.26 (22 1354)  ADL Inpatient CMS 0-100% Score: 50.11 (22 1354)  ADL Inpatient CMS G-Code Modifier : CK (22 1354)    Patient Diagnosis(es): The primary encounter diagnosis was Closed intertrochanteric fracture of hip, right, initial encounter ([facility] [address] A diagnosis of Fall, initial encounter was also pertinent to this visit. Assessment    Assessment: Pt contineus to demo progress toward OT goals. Pt SBA for functional transfers this date, cues for safety and not to stand prior to RW placed in front of her. Pt fatigued due to severe pain overnight, requesting return to bed at EOS. Pt tolerated 15 reps UE therex seated in chair. Pt functioning below her baseline without 24 hr A available at d/c, continue to recommend SNF at d/c. Activity Tolerance: Patient tolerated treatment well  Discharge Recommendations: [address]  Times per Week: 4-6x's a week while in acute care     Restrictions  Restrictions/Precautions  Restrictions/Precautions: Weight Bearing; Fall Risk  Lower Extremity Weight Bearing Restrictions  Right Lower Extremity Weight Bearing: Weight Bearing As Tolerated    Subjective   Subjective  Subjective: Pt seated on shower chair at sink at approach. Pt performing seated grooming tasks. Pt agreeable to OT treatment. Pain: Pt did not report pain during session.     Orientation  Overall Orientation Status: Within Normal Limits    Cognition  Safety Judgement: Decreased awareness of need for safety (pt attempting to stand prior to walker in front of her)        Objective    Vitals  Vitals  Heart Rate: 89  Heart Rate Source: Monitor  BP: 91/62  BP Location: Left upper arm  BP Method: Automatic  Patient Position: Sitting  MAP (Calculated): 71.67  SpO2: 94 %  O2 Device: None (Room air)    Bed Mobility Training  Bed Mobility Training: Yes  Supine to Sit: Stand-by assistance; Additional time  Sit to Supine: Moderate assistance    Balance  Sitting: Intact  Standing: Impaired (with RW, functional/bathroom mobility, standing to pull thigh high DAXA hose up)  Standing - Static: Good  Standing - Dynamic: Fair       ADL  Grooming: Supervision  Grooming Skilled Clinical Factors: seated in shower chair at sink to brush hair, complete oral hygiene  OT Exercises  A/AROM Exercises: Pt performed the folllowing UE therex seated in chair: 5x chair push ups, 15x shoulder flexion, 15x horizontal aBd/aDduction, elbow flex/extension, forearm pronation/supination, grasp/release     Safety Devices  Type of Devices: Left in bed;Bed alarm in place;Call light within reach;Nurse notified;Gait belt     Patient Education  Education Given To: Patient  Education Provided: Role of Therapy;Plan of Care;ADL Adaptive Strategies;Transfer Training;Equipment; Fall Prevention Strategies; Home Exercise Program  Education Method: Demonstration;Verbal  Barriers to Learning: None  Education Outcome: Verbalized understanding;Demonstrated understanding;Continued education needed    Goals  Short Term Goals  Time Frame for Short term goals: 1 week (6/25) unless noted  Short Term Goal 1: Pt will be formally assessed for functional transfers-- GOAL MET, pt CGA-min A 6/21/22  Short Term Goal 2: Pt will perform bed mobility with supv  Short Term Goal 3: Pt will perform x15 BUE exercises  Short Term Goal 4: Pt will perform 2-3 grooming tasks in stance with LRAD  Short Term Goal 5: Pt will tolerate standing for x5 mins in preparation for standing ADLs  Patient Goals   Patient goals : \"I want to be able to get to the toilet\"       Therapy Time   Individual Concurrent Group Co-treatment   Time In 1310         Time Out 1350         Minutes [address], JOSEPH/L

## 2022-06-23 NOTE — PLAN OF CARE
Standard safety measures are in place. Call light within reach, non skid socks are on patient, bed is in lowest position. Patient knows to call for assistance to get up.   Problem: Safety - Adult  Goal: Free from fall injury  Outcome: Progressing

## 2022-06-23 NOTE — PROGRESS NOTES
Physical Therapy  Facility/Department: [facility] C5 - MED SURG/ORTHO  Daily Treatment Note  NAME: Mauro Delgado  : 1945  MRN: [id number]    Date of Service: 2022    Discharge Recommendations:  Subacute/Skilled Nursing Facility   PT Equipment Recommendations  Equipment Needed: No (defer to snf)  [facility] 6 Clicks Inpatient Mobility:  AM-PAC Mobility Inpatient   How much difficulty turning over in bed?: A Little  How much difficulty sitting down on / standing up from a chair with arms?: A Little  How much difficulty moving from lying on back to sitting on side of bed?: None  How much help from another person moving to and from a bed to a chair?: A Little  How much help from another person needed to walk in hospital room?: A Little  How much help from another person for climbing 3-5 steps with a railing?: A Lot  AM-PAC Inpatient Mobility Raw Score : 18  AM-PAC Inpatient T-Scale Score : 43.63  Mobility Inpatient CMS 0-100% Score: 46.58  Mobility Inpatient CMS G-Code Modifier : CK    Patient Diagnosis(es): The primary encounter diagnosis was Closed intertrochanteric fracture of hip, right, initial encounter ([facility] [address] A diagnosis of Fall, initial encounter was also pertinent to this visit. Assessment   Assessment: Pt demos needing SBA for bed mobility, transfers and spv for gait with walker up to 50'. Pt participated in RLEexs with Annalisa in supine X 15 reps. Pt lives alone and is unable to safely return to home needing this level of assist.  Pt is recommended for con't skilled PTand SNFat at D/C.   Activity Tolerance: Patient tolerated treatment well  Equipment Needed: No (defer to snf)     Plan    Plan  Plan: 1 time a day 7 days a week  Specific Instructions for Next Treatment: Progress mobility as tolerated, assess t/f and gait if tolerated  Current Treatment Recommendations: Strengthening;Equipment evaluation, education, & procurement;Balance training;Gait training;Modalities;Stair training;Functional mobility training;Transfer training;Neuromuscular re-education;Home exercise program;Positioning; Safety education & training;Patient/Caregiver education & training; Therapeutic activities; Endurance training     Restrictions  Restrictions/Precautions  Restrictions/Precautions: Weight Bearing,Fall Risk  Lower Extremity Weight Bearing Restrictions  Right Lower Extremity Weight Bearing: Weight Bearing As Tolerated  Position Activity Restriction  Other position/activity restrictions: . Subjective    Subjective  Subjective: Pt agrees to PTsession though in more fatigue from last night  Pain: 6/10  Orientation  Overall Orientation Status: Within Normal Limits     Objective   Vitals  (112/71  HR 95  O2 94=3% RA)  Bed Mobility Training  Bed Mobility Training: Yes  Supine to Sit: Stand-by assistance; Additional time  Balance  Sitting: Intact  Standing: Intact  Standing - Static: Good  Standing - Dynamic: Good  Transfer Training  Transfer Training: Yes  Sit to Stand: Stand-by assistance  Stand to Sit: Stand-by assistance  Gait Training  Gait Training: Yes  Right Side Weight Bearing: As tolerated  Gait  Overall Level of Assistance: Supervision  Interventions: Verbal cues  Speed/Alda: Slow  Gait Abnormalities: Decreased step clearance  Distance (ft): 50 Feet (2x)  Assistive Device: Walker, rolling  Rail Use:  (Pt declined trial of stairs d/t increased fatigue and pain)     PT Exercises  Exercise Treatment: AP,QS, GS, heelslides, abd and SAQ in bed with up to min A X 15 RLE     Safety Devices  Type of Devices: Left in chair;Chair alarm in place;Call light within reach;Nurse notified;Gait belt       Goals  Short Term Goals  Time Frame for Short term goals: 1 week 6/26/22 (unless otherwise specified)  Short term goal 1: Pt will complete supine to/from sit with I  -6/23 sba  Short term goal 2: Pt will complete sit to/from stand with LRAD with SBA   -6/23 sba  Short term goal 3: Pt will ambulate 48' with RW with SBA without LOB   -6/23 met. New: w/ mod indep  Short term goal 4: Pt will ascend/descend 4 steps with HR with CGA without LOB   -6/23 NT  Short term goal 5: 6/23/22: Pt will participate in 12-15 reps BLE exercises to increase strength and increase I with functional mobility and gait   -6/23  met, on-going  Patient Goals   Patient goals : \"Get stronger\" -6/23 progressing    Education  Patient Education  Education Given To: Patient  Education Provided: Role of Therapy;Plan of Care;Precautions;Transfer Training  Education Provided Comments: Educated in [employer] px, healing and progression of activity.  Pt educated on safe use of AD  Education Method: Demonstration;Verbal  Barriers to Learning: None  Education Outcome: Verbalized understanding;Demonstrated understanding    Therapy Time   Individual Concurrent Group Co-treatment   Time In 0800         Time Out 0838         Minutes 38         Timed Code Treatment Minutes: [address]

## 2022-06-23 NOTE — CARE COORDINATION
6/23/22 Call placed to [facility] to inquire about precert, left Vm, waiting for return phone call.

## 2022-06-23 NOTE — PROGRESS NOTES
Hospitalist Progress Note      PCP: No primary care provider on file. Date of Admission: 6/18/2022    Chief Complaint: Fall, right hip pain    Hospital Course:   78 y.o. female who presented to [facility] with above complaints  Patient with PMH of lung CA s/p resection, currently in remission, Hx of breast cancer s/p surgery, tobacco abuse, HTN, COPD presenting to the ED today with complaints of fall. Patient reports earlier today she was getting out of her car when she lost balance, tripped and fell on her right side with the brunt of the impact on her right hip. After the fall she had severe right hip and right groin pain due to which she was unable to stand or ambulate. No head trauma or loss of consciousness. No chest pain. Denies any neck pain or headache. Patient apparently had been drinking prior to the fall, she consumed 1 drink. Patient reports she is very active physically. Is able to walk a few blocks, climb a couple of flights of stairs easily without any symptoms. Subjective: has small ulcer on tip of tongue that is very painful.      Medications:  Reviewed    Infusion Medications    sodium chloride       Scheduled Medications    polyethylene glycol  17 g Oral BID    enoxaparin  40 mg SubCUTAneous Daily    acetaminophen  650 mg Oral Q6H    methocarbamol  500 mg Oral TID    nicotine  1 patch TransDERmal Daily    sodium chloride flush  5-40 mL IntraVENous 2 times per day    escitalopram  20 mg Oral Daily    amLODIPine  5 mg Oral Daily    losartan  100 mg Oral Daily     PRN Meds: diphenhydrAMINE, benzocaine, phenol, albuterol sulfate HFA **AND** ipratropium, sodium chloride flush, sodium chloride, ondansetron **OR** ondansetron, oxyCODONE **OR** oxyCODONE      Intake/Output Summary (Last 24 hours) at 6/23/2022 1552  Last data filed at 6/23/2022 1249  Gross per 24 hour   Intake 860 ml   Output --   Net 860 ml       Physical Exam Performed:  /66   Pulse 80   Temp 97.5 °F (36.4 °C) (Oral)   Resp 16   Ht 5' 2\" (1.575 m)   Wt 150 lb 5.7 oz (68.2 kg)   SpO2 93%   BMI 27.50 kg/m²     General appearance: No apparent distress, appears stated age and cooperative. HEENT: Pupils equal, round, and reactive to light. Conjunctivae/corneas clear. Neck: Supple, with full range of motion. No jugular venous distention. Trachea midline. Respiratory:  Normal respiratory effort. Clear to auscultation, bilaterally without Rales/Wheezes/Rhonchi. Cardiovascular: Regular rate and rhythm with normal S1/S2 without murmurs, rubs or gallops. Abdomen: Soft, non-tender, non-distended with normal bowel sounds. Musculoskeletal: No clubbing, cyanosis or edema bilaterally. S/p Right Hip IM nail fixation   Skin: Skin color, texture, turgor normal.  No rashes or lesions. Neurologic:  Neurovascularly intact without any focal sensory/motor deficits. Cranial nerves: II-XII intact, grossly non-focal.  Psychiatric: Alert and oriented, thought content appropriate, normal insight  Capillary Refill: Brisk,< 3 seconds   Peripheral Pulses: +2 palpable, equal bilaterally       Labs:   Recent Labs     06/22/22  1009   WBC 7.7   HGB 12.1   HCT 36.4        Recent Labs     06/22/22  1009      K 3.8      CO2 27   BUN 10   CREATININE 0.6   CALCIUM 9.7     No results for input(s): AST, ALT, BILIDIR, BILITOT, ALKPHOS in the last 72 hours. No results for input(s): INR in the last 72 hours. Radiology:  XR HIP 2-3 VW W PELVIS RIGHT   Final Result   Anatomic alignment status post ORIF right femoral neck         XR HIP RIGHT (2-3 VIEWS)   Final Result   Successful reduction of right femoral intertrochanteric fracture with   intramedullary nail system. FLUORO FOR SURGICAL PROCEDURES   Final Result      XR FEMUR RIGHT (MIN 2 VIEWS)   Final Result   Stable acute comminuted displaced intertrochanteric fracture of the proximal   right femur, similar to the study earlier today.   No additional femoral   fracture is identified. CT Head WO Contrast   Final Result   Chronic findings in the brain without acute CT abnormality identified. XR CHEST PORTABLE   Final Result   No acute cardiopulmonary abnormality. XR HIP RIGHT (2-3 VIEWS)   Final Result   Mildly displaced and angulated intertrochanteric right femur fracture. Active Hospital Problems    Diagnosis Date Noted    Closed comminuted intertrochanteric fracture of proximal end of right femur, initial encounter ([facility] [address] Kiko Malloy 06/18/2022     Priority: Medium    Tobacco use disorder [F17.200] 07/26/2016     Priority: Medium    Generalized anxiety disorder [F41.1] 07/26/2016     Priority: Medium    Essential hypertension [I10] 07/26/2016     Priority: Medium    Malignant neoplasm of lung ([facility] [address] [C34.90] 03/05/2008     Priority: Medium     Assessment/Plan:  Right femur fracture  - orthopedic surgery consulted  - s/p OR for IM nail  - post-op management per surgery  - pain control ordered  - PT/OT    Tongue ulceration  - likely related to intubation  - topical pain control  - supportive care    HTN  - well controlled  - continue norvasc, losartan    COPD  - no evidence of exacerbation  - continue home inhalers    H/O lung cancer, breast cancer  - s/p lobectomy, bilateral mastectomy  - follow up with hem/onc as outpatient    Anxiety  - mood stable  - continue lexapro    Tobacco dependence  - counseling given. Nicotine replacement ordered    DVT Prophylaxis: Per Ortho   Diet: ADULT DIET; Regular  Code Status: Full Code    PT/OT Eval Status: Will need    Dispo - SNF pending precert.  Medically ready for discharge    Mahogany Hagen MD

## 2022-06-24 PROCEDURE — APPNB30 APP NON BILLABLE TIME 0-30 MINS: Performed by: SPECIALIST/TECHNOLOGIST

## 2022-06-24 PROCEDURE — 2580000003 HC RX 258: Performed by: STUDENT IN AN ORGANIZED HEALTH CARE EDUCATION/TRAINING PROGRAM

## 2022-06-24 PROCEDURE — 6370000000 HC RX 637 (ALT 250 FOR IP): Performed by: SPECIALIST/TECHNOLOGIST

## 2022-06-24 PROCEDURE — 97116 GAIT TRAINING THERAPY: CPT

## 2022-06-24 PROCEDURE — 97530 THERAPEUTIC ACTIVITIES: CPT

## 2022-06-24 PROCEDURE — 6370000000 HC RX 637 (ALT 250 FOR IP): Performed by: INTERNAL MEDICINE

## 2022-06-24 PROCEDURE — 6360000002 HC RX W HCPCS: Performed by: SPECIALIST/TECHNOLOGIST

## 2022-06-24 PROCEDURE — 1200000000 HC SEMI PRIVATE

## 2022-06-24 PROCEDURE — 97110 THERAPEUTIC EXERCISES: CPT

## 2022-06-24 PROCEDURE — 99024 POSTOP FOLLOW-UP VISIT: CPT | Performed by: SPECIALIST/TECHNOLOGIST

## 2022-06-24 PROCEDURE — 6370000000 HC RX 637 (ALT 250 FOR IP): Performed by: STUDENT IN AN ORGANIZED HEALTH CARE EDUCATION/TRAINING PROGRAM

## 2022-06-24 PROCEDURE — 6370000000 HC RX 637 (ALT 250 FOR IP): Performed by: NURSE PRACTITIONER

## 2022-06-24 RX ADMIN — OXYCODONE 10 MG: 5 TABLET ORAL at 21:02

## 2022-06-24 RX ADMIN — ENOXAPARIN SODIUM 40 MG: 100 INJECTION SUBCUTANEOUS at 08:52

## 2022-06-24 RX ADMIN — ACETAMINOPHEN 650 MG: 325 TABLET ORAL at 06:22

## 2022-06-24 RX ADMIN — ESCITALOPRAM OXALATE 20 MG: 10 TABLET ORAL at 08:52

## 2022-06-24 RX ADMIN — POLYETHYLENE GLYCOL 3350 17 G: 17 POWDER, FOR SOLUTION ORAL at 08:53

## 2022-06-24 RX ADMIN — ACETAMINOPHEN 650 MG: 325 TABLET ORAL at 21:02

## 2022-06-24 RX ADMIN — METHOCARBAMOL TABLETS 500 MG: 750 TABLET, COATED ORAL at 20:01

## 2022-06-24 RX ADMIN — ACETAMINOPHEN 650 MG: 325 TABLET ORAL at 10:32

## 2022-06-24 RX ADMIN — DIPHENHYDRAMINE HCL 25 MG: 25 TABLET ORAL at 17:53

## 2022-06-24 RX ADMIN — METHOCARBAMOL TABLETS 500 MG: 750 TABLET, COATED ORAL at 08:52

## 2022-06-24 RX ADMIN — SODIUM CHLORIDE, PRESERVATIVE FREE 10 ML: 5 INJECTION INTRAVENOUS at 08:54

## 2022-06-24 RX ADMIN — ACETAMINOPHEN 650 MG: 325 TABLET ORAL at 16:06

## 2022-06-24 RX ADMIN — LOSARTAN POTASSIUM 100 MG: 25 TABLET, FILM COATED ORAL at 08:53

## 2022-06-24 RX ADMIN — AMLODIPINE BESYLATE 5 MG: 5 TABLET ORAL at 08:52

## 2022-06-24 RX ADMIN — OXYCODONE 5 MG: 5 TABLET ORAL at 06:22

## 2022-06-24 RX ADMIN — SODIUM CHLORIDE, PRESERVATIVE FREE 10 ML: 5 INJECTION INTRAVENOUS at 20:02

## 2022-06-24 RX ADMIN — POLYETHYLENE GLYCOL 3350 17 G: 17 POWDER, FOR SOLUTION ORAL at 20:01

## 2022-06-24 RX ADMIN — METHOCARBAMOL TABLETS 500 MG: 750 TABLET, COATED ORAL at 16:06

## 2022-06-24 ASSESSMENT — PAIN SCALES - GENERAL
PAINLEVEL_OUTOF10: 7
PAINLEVEL_OUTOF10: 6
PAINLEVEL_OUTOF10: 4
PAINLEVEL_OUTOF10: 7

## 2022-06-24 ASSESSMENT — PAIN DESCRIPTION - LOCATION: LOCATION: HIP

## 2022-06-24 ASSESSMENT — PAIN DESCRIPTION - ORIENTATION: ORIENTATION: RIGHT

## 2022-06-24 ASSESSMENT — PAIN DESCRIPTION - DESCRIPTORS: DESCRIPTORS: ACHING

## 2022-06-24 NOTE — PROGRESS NOTES
Occupational Therapy  OT follow up attempted x2, 1st attempt pt refusing due to fatigue, just returned to bed. 2nd attempt spiritual care visiting with patient. Will continue to follow per POC.   JAKE Florentino/WU

## 2022-06-24 NOTE — PROGRESS NOTES
Comprehensive Nutrition Assessment    Type and Reason for Visit:  Initial,RD Nutrition Re-Screen/LOS    Nutrition Recommendations/Plan:   1. Continue regular diet and encourage PO intake   2. RD to add enusre BID and magic cups w/ lunch   3. RD to order new updated weight   4. Monitor nutrition adequacy, pertinent labs, bowel habits, wt changes, and clinical progress     Malnutrition Assessment:  Malnutrition Status: At risk for malnutrition (Comment) (06/24/22 1323)    Context:  Acute Illness     Findings of the 6 clinical characteristics of malnutrition:  Energy Intake:  75% or less of estimated energy requirements for 7 or more days  Fluid Accumulation:  Mild      Nutrition Assessment:    LOS assessment: Pt admitted s/p fall, hip pain. S/p rt hip IM nail denise insertion on 6/19. On regular diet. Pt reports good intake PTA, intake has been poor since surgery d/t tongue injury. She reports she has been eating liquid and lighter foods like soups and yogurt. Pt repots no wt loss, UBW= 135 lb, RD to order new updated weight d/t stated weight. No N/V reported. Pt willing to trial ensure and magic cups, RD to add. Pt likes chocolate. Continue to encourage PO intake, will continue to monitor. Nutrition Related Findings:    Trace edema. + BM yesterday. Labs reviewed. Difficulty swallowing d/t tongue pain. Wound Type: Surgical Incision       Current Nutrition Intake & Therapies:    Average Meal Intake: 51-75%,%  Average Supplements Intake: None Ordered  ADULT DIET; Regular  ADULT ORAL NUTRITION SUPPLEMENT; Breakfast, Dinner; Standard High Calorie/High Protein Oral Supplement  ADULT ORAL NUTRITION SUPPLEMENT; Lunch; Frozen Oral Supplement    Anthropometric Measures:  Height: 5' 2\" (157.5 cm)  Ideal Body Weight (IBW): 110 lbs (50 kg)       Current Body Weight: 150 lb (68 kg), 136.4 % IBW.  Weight Source: Stated  Current BMI (kg/m2): 27.4  Usual Body Weight: 135 lb (61.2 kg) (per pt)  % Weight Change (Calculated): 11.1                 BMI Categories: Overweight (BMI 25.0-29. 9)    Estimated Daily Nutrient Needs:  Energy Requirements Based On: Kcal/kg (30-35 kcals/kg)  Weight Used for Energy Requirements: Ideal (50 kg)  Energy (kcal/day): 3237-9937  Weight Used for Protein Requirements: Ideal (1-1.2 g/kg)  Protein (g/day): 50-60 g  Method Used for Fluid Requirements: 1 ml/kcal    Nutrition Diagnosis:   · Inadequate oral intake related to swallowing difficulty,inadequate protein-energy intake,pain as evidenced by intake 51-75% (Ulcer on tongue)    Nutrition Interventions:   Food and/or Nutrient Delivery: Continue Current Diet,Start Oral Nutrition Supplement  Nutrition Education/Counseling: Education not indicated  Coordination of Nutrition Care: Continue to monitor while inpatient       Goals:     Goals: PO intake 50% or greater,prior to discharge       Nutrition Monitoring and Evaluation:   Behavioral-Environmental Outcomes: None Identified  Food/Nutrient Intake Outcomes: Food and Nutrient Intake,Supplement Intake  Physical Signs/Symptoms Outcomes: Biochemical Data,Chewing or Swallowing,Fluid Status or Edema,Weight,Nutrition Focused Physical Findings    Discharge Planning:    Continue current diet,Continue Oral Nutrition Supplement     Nga Palomo [address], [address] [place] [address],   Contact: Office: [phone]; [facility]: [phone]

## 2022-06-24 NOTE — CARE COORDINATION
3/93/40 Di precert for [place] still pending. Reached out to our [employer] with no answer. Transport canceled.

## 2022-06-24 NOTE — FLOWSHEET NOTE
06/24/22 1422   Encounter Summary   Encounter Overview/Reason  Initial Encounter   Service Provided For: Patient   Referral/Consult From: [facility], [place]   Support System Children;Friends/neighbors   Last Encounter  06/24/22  (Listening, support, prayer)   Complexity of Encounter Moderate   Begin Time 1352   End Time  1425   Total Time Calculated 33 min   Encounter    Type Initial Screen/Assessment   Assessment/Intervention/Outcome   Assessment Coping;Powerlessness   Intervention Active listening;Discussed belief system/Sabianism practices/tapan;Discussed illness injury and its impact; Explored/Affirmed feelings, thoughts, concerns;Explored Coping Skills/Resources; Life review/Legacy; Nurtured Hope;Prayer (assurance of)/Kelley   Outcome Comfort;Coping;Engaged in conversation;Expressed feelings, needs, and concerns;Expressed Gratitude;Receptive

## 2022-06-24 NOTE — PROGRESS NOTES
Physical Therapy  Facility/Department: [facility] C5 - MED SURG/ORTHO  Daily Treatment Note  NAME: Cindy Brantley  : 1945  MRN: [id number]    Date of Service: 2022    Discharge Recommendations:  Subacute/Skilled Nursing Facility   PT Equipment Recommendations  Equipment Needed: No (defer to snf)  [facility] 6 Clicks Inpatient Mobility:  AM-PAC Mobility Inpatient   How much difficulty turning over in bed?: A Little  How much difficulty sitting down on / standing up from a chair with arms?: A Little  How much difficulty moving from lying on back to sitting on side of bed?: None  How much help from another person moving to and from a bed to a chair?: A Little  How much help from another person needed to walk in hospital room?: A Little  How much help from another person for climbing 3-5 steps with a railing?: A Lot  AM-PAC Inpatient Mobility Raw Score : 18  AM-PAC Inpatient T-Scale Score : 43.63  Mobility Inpatient CMS 0-100% Score: 46.58  Mobility Inpatient CMS G-Code Modifier : CK    Patient Diagnosis(es): The primary encounter diagnosis was Closed intertrochanteric fracture of hip, right, initial encounter ([facility] [address] A diagnosis of Fall, initial encounter was also pertinent to this visit. Assessment   Assessment: Pt demos needing SBA/cga  for bed mobility, transfers and spv for gait with walker up to 65'. Pt participated in RLEexs with in supine and sittingX 15 reps. Pt lives alone and is unable to safely return to home needing this level of assist.  Pt is recommended for con't skilled PTand SNFat at D/C.   Activity Tolerance: Patient tolerated treatment well  Equipment Needed: No (defer to snf)     Plan    Plan  Plan: 1 time a day 7 days a week  Specific Instructions for Next Treatment: Progress mobility as tolerated, assess t/f and gait if tolerated  Current Treatment Recommendations: Strengthening;Equipment evaluation, education, & procurement;Balance training;Gait training;Modalities;Stair training;Functional mobility training;Transfer training;Neuromuscular re-education;Home exercise program;Positioning; Safety education & training;Patient/Caregiver education & training; Therapeutic activities; Endurance training     Restrictions  Restrictions/Precautions  Restrictions/Precautions: Weight Bearing,Fall Risk  Lower Extremity Weight Bearing Restrictions  Right Lower Extremity Weight Bearing: Weight Bearing As Tolerated  Position Activity Restriction  Other position/activity restrictions: . Subjective    Subjective  Subjective: Pt agrees to PTsession though c/o fatigue  Pain: 7/10  Orientation  Overall Orientation Status: Within Normal Limits     Objective   Vitals   (HR 77  O2 93% RA  /69)  Bed Mobility Training  Bed Mobility Training: Yes  Supine to Sit: Stand-by assistance; Additional time  Sit to Supine: Contact-guard assistance; Additional time  Balance  Sitting: Intact  Standing: Impaired  Standing - Static: Good  Standing - Dynamic: Fair  Transfer Training  Transfer Training: Yes  Sit to Stand: Stand-by assistance  Stand to Sit: Stand-by assistance  Toilet Transfer: Stand-by assistance  Gait Training  Gait Training: Yes  Right Side Weight Bearing: As tolerated  Gait  Overall Level of Assistance: Supervision  Interventions: Verbal cues  Speed/Alda: Slow  Step Length: Left shortened;Right shortened  Distance (ft): 65 Feet (25')  Assistive Device: Walker, rolling  Rail Use:  (Pt declined trial of stairs d/t increased fatigue and pain)     PT Exercises  Exercise Treatment: performed BLE TE 15X EACH: AP, SAQ, QS, GS, SLR, HIP ABD, LAQ,HS     Safety Devices  Type of Devices: Left in bed;Bed alarm in place;Call light within reach;Nurse notified;Gait belt       Goals  Short Term Goals  Time Frame for Short term goals: 1 week 6/26/22 (unless otherwise specified)  Short term goal 1: Pt will complete supine to/from sit with I  -6/24 sba/cga  Short term goal 2: Pt will complete sit to/from stand with LRAD with SBA   -6/24 sba  Short term goal 3: Pt will ambulate 48' with RW with SBA without LOB   -6/24 met. New: w/ mod indep  Short term goal 4: Pt will ascend/descend 4 steps with HR with CGA without LOB   -6/24 NT  Short term goal 5: 6/23/22: Pt will participate in 12-15 reps BLE exercises to increase strength and increase I with functional mobility and gait   -6/24  met, on-going  Patient Goals   Patient goals : \"Get stronger\" -6/24 progressing    Education  Patient Education  Education Given To: Patient  Education Provided: Role of Therapy;Plan of Care;Precautions;Transfer Training  Education Provided Comments: Educated in [employer] px, healing and progression of activity. Pt educated on safe use of AD  Education Method: Demonstration;Verbal  Barriers to Learning: None  Education Outcome: Verbalized understanding;Demonstrated understanding    Therapy Time   Individual Concurrent Group Co-treatment   Time In 1040         Time Out 1118         Minutes 38         Timed Code Treatment Minutes: 38 Minutes     If pt is unable to be seen after this session, please let this note serve as discharge summary. Please see case management note for discharge disposition. Thank you.     Ronny Landeros

## 2022-06-24 NOTE — CARE COORDINATION
6/24/22 Spoke to Kenan at [facility] and precert is still pending. Kenan is reaching out to her Di Rep. Will follow.

## 2022-06-24 NOTE — PROGRESS NOTES
Department of Orthopedic Surgery  Physician Assistant   Progress Note    Subjective:       Systemic or Specific Complaints: has some hip soreness after working with therapy. Anxious to DC    Objective:     Patient Vitals for the past 24 hrs:   BP Temp Temp src Pulse Resp SpO2   06/23/22 2315 118/83 98.3 °F (36.8 °C) Oral 76 16 --   06/23/22 1915 101/62 97.9 °F (36.6 °C) Oral 88 16 --   06/23/22 1411 101/66 97.5 °F (36.4 °C) Oral 80 16 93 %   06/23/22 1330 91/62 -- -- 89 -- 94 %   06/23/22 1219 -- -- -- -- 16 --       General: alert, appears stated age, cooperative and no distress   Wound: Wound clean and dry no evidence of infection. , No Erythema, No Drainage and Positive for Edema   Motion: Painful range of Motion in affected extremity   DVT Exam: No evidence of DVT seen on physical exam.  No cords or calf tenderness. No significant calf/ankle edema. Additional exam: pt seen laying in bed at time of interview, bilateral SCD sleeves applied, bilateral thigh high david hose applied  Thigh moderately swollen, compartments firm but compressible  Non-tender to palpation along the lateral thigh, some tenderness to anterior thigh, improving  mepilex dressings CDI  Sensation intact to light touch throughout entirety of RLE  Ehl, fhl, gastroc, ant tib motor intact  DP and PT pulses 2+, feet WWP    Data Review  CBC:   Lab Results   Component Value Date    WBC 7.7 06/22/2022    RBC 3.82 06/22/2022    HGB 12.1 06/22/2022    HCT 36.4 06/22/2022     06/22/2022       Renal:   Lab Results   Component Value Date     06/22/2022    K 3.8 06/22/2022    K 4.0 06/18/2022     06/22/2022    CO2 27 06/22/2022    BUN 10 06/22/2022    CREATININE 0.6 06/22/2022    GLUCOSE 127 06/22/2022    CALCIUM 9.7 06/22/2022            Assessment:     S/p right hip IM nail, postop day 5.   Date of surgery 6/19/2022 with Dr. Jessica Larson:      1: Weightbearing as tolerated right lower extremity with assistive device  2:  Continue Deep venous thrombosis prophylaxis-Lovenox 40 mg daily for 4 weeks postoperatively  3:  Continue Pain Control-scheduled Tylenol, Robaxin. Oxycodone as needed  4: PT/OT eval completed, recommending SNF, CM following, awaiting precert  5: 2 doses IV Ancef completed postoperatively  6: OK to DC from ortho standpoint pending placement. Follow-up with Dr. Arielle Roman 2 weeks postoperatively. DCP updated.      Jay Plaster, [id number]

## 2022-06-24 NOTE — PROGRESS NOTES
Hospitalist Progress Note      PCP: No primary care provider on file. Date of Admission: 6/18/2022    Chief Complaint: Fall, right hip pain    Hospital Course:   78 y.o. female who presented to [facility] with above complaints  Patient with PMH of lung CA s/p resection, currently in remission, Hx of breast cancer s/p surgery, tobacco abuse, HTN, COPD presenting to the ED today with complaints of fall. Patient reports earlier today she was getting out of her car when she lost balance, tripped and fell on her right side with the brunt of the impact on her right hip. After the fall she had severe right hip and right groin pain due to which she was unable to stand or ambulate. No head trauma or loss of consciousness. No chest pain. Denies any neck pain or headache. Patient apparently had been drinking prior to the fall, she consumed 1 drink. Patient reports she is very active physically. Is able to walk a few blocks, climb a couple of flights of stairs easily without any symptoms. Subjective: has small ulcer on tip of tongue that is very painful.      Medications:  Reviewed    Infusion Medications    sodium chloride       Scheduled Medications    polyethylene glycol  17 g Oral BID    enoxaparin  40 mg SubCUTAneous Daily    acetaminophen  650 mg Oral Q6H    methocarbamol  500 mg Oral TID    nicotine  1 patch TransDERmal Daily    sodium chloride flush  5-40 mL IntraVENous 2 times per day    escitalopram  20 mg Oral Daily    amLODIPine  5 mg Oral Daily    losartan  100 mg Oral Daily     PRN Meds: diphenhydrAMINE, benzocaine, phenol, albuterol sulfate HFA **AND** ipratropium, sodium chloride flush, sodium chloride, ondansetron **OR** ondansetron, oxyCODONE **OR** oxyCODONE    No intake or output data in the 24 hours ending 06/24/22 7982    Physical Exam Performed:  BP 99/61   Pulse 77   Temp 97.9 °F (36.6 °C) (Oral)   Resp 14   Ht 5' 2\" (1.575 m)   Wt 150 lb 5.7 oz (68.2 kg)   SpO2 90%   BMI 27.50 kg/m²     General appearance: No apparent distress, appears stated age and cooperative. HEENT: Pupils equal, round, and reactive to light. Conjunctivae/corneas clear. Neck: Supple, with full range of motion. No jugular venous distention. Trachea midline. Respiratory:  Normal respiratory effort. Clear to auscultation, bilaterally without Rales/Wheezes/Rhonchi. Cardiovascular: Regular rate and rhythm with normal S1/S2 without murmurs, rubs or gallops. Abdomen: Soft, non-tender, non-distended with normal bowel sounds. Musculoskeletal: No clubbing, cyanosis or edema bilaterally. S/p Right Hip IM nail fixation   Skin: Skin color, texture, turgor normal.  No rashes or lesions. Neurologic:  Neurovascularly intact without any focal sensory/motor deficits. Cranial nerves: II-XII intact, grossly non-focal.  Psychiatric: Alert and oriented, thought content appropriate, normal insight  Capillary Refill: Brisk,< 3 seconds   Peripheral Pulses: +2 palpable, equal bilaterally       Labs:   Recent Labs     06/22/22  1009   WBC 7.7   HGB 12.1   HCT 36.4        Recent Labs     06/22/22  1009      K 3.8      CO2 27   BUN 10   CREATININE 0.6   CALCIUM 9.7     No results for input(s): AST, ALT, BILIDIR, BILITOT, ALKPHOS in the last 72 hours. No results for input(s): INR in the last 72 hours. Radiology:  XR HIP 2-3 VW W PELVIS RIGHT   Final Result   Anatomic alignment status post ORIF right femoral neck         XR HIP RIGHT (2-3 VIEWS)   Final Result   Successful reduction of right femoral intertrochanteric fracture with   intramedullary nail system. FLUORO FOR SURGICAL PROCEDURES   Final Result      XR FEMUR RIGHT (MIN 2 VIEWS)   Final Result   Stable acute comminuted displaced intertrochanteric fracture of the proximal   right femur, similar to the study earlier today. No additional femoral   fracture is identified.          CT Head WO Contrast   Final Result   Chronic findings in the brain without acute CT abnormality identified. XR CHEST PORTABLE   Final Result   No acute cardiopulmonary abnormality. XR HIP RIGHT (2-3 VIEWS)   Final Result   Mildly displaced and angulated intertrochanteric right femur fracture. Active Hospital Problems    Diagnosis Date Noted    Closed comminuted intertrochanteric fracture of proximal end of right femur, [facility] Valente Cox 06/18/2022     Priority: Medium    Tobacco use disorder [F17.200] 07/26/2016     Priority: Medium    Generalized anxiety disorder [F41.1] 07/26/2016     Priority: Medium    Essential hypertension [I10] 07/26/2016     Priority: Medium    Malignant neoplasm of lung ([facility] [C34.90] 03/05/2008     Priority: Medium     Assessment/Plan:  Right femur fracture  - orthopedic surgery consulted  - s/p OR for IM nail  - post-op management per surgery  - pain control ordered  - PT/OT    Tongue ulceration  - likely related to intubation  - topical pain control  - supportive care    HTN  - well controlled  - continue norvasc, losartan    COPD  - no evidence of exacerbation  - continue home inhalers    H/O lung cancer, breast cancer  - s/p lobectomy, bilateral mastectomy  - follow up with hem/onc as outpatient    Anxiety  - mood stable  - continue lexapro    Tobacco dependence  - counseling given. Nicotine replacement ordered    DVT Prophylaxis: Per Ortho   Diet: ADULT DIET; Regular  ADULT ORAL NUTRITION SUPPLEMENT; Breakfast, Dinner; Standard High Calorie/High Protein Oral Supplement  ADULT ORAL NUTRITION SUPPLEMENT; Lunch; Frozen Oral Supplement  Code Status: Full Code    PT/OT Eval Status: Will need    Dispo - SNF pending precert.  Medically ready for discharge    Isabel Hummel MD

## 2022-06-25 VITALS
TEMPERATURE: 97.7 F | HEART RATE: 83 BPM | DIASTOLIC BLOOD PRESSURE: 80 MMHG | HEIGHT: 62 IN | RESPIRATION RATE: 16 BRPM | WEIGHT: 150.35 LBS | OXYGEN SATURATION: 95 % | SYSTOLIC BLOOD PRESSURE: 119 MMHG | BODY MASS INDEX: 27.67 KG/M2

## 2022-06-25 PROCEDURE — 97116 GAIT TRAINING THERAPY: CPT

## 2022-06-25 PROCEDURE — 97535 SELF CARE MNGMENT TRAINING: CPT

## 2022-06-25 PROCEDURE — 97530 THERAPEUTIC ACTIVITIES: CPT

## 2022-06-25 PROCEDURE — 2580000003 HC RX 258: Performed by: STUDENT IN AN ORGANIZED HEALTH CARE EDUCATION/TRAINING PROGRAM

## 2022-06-25 PROCEDURE — 6370000000 HC RX 637 (ALT 250 FOR IP): Performed by: INTERNAL MEDICINE

## 2022-06-25 PROCEDURE — 97110 THERAPEUTIC EXERCISES: CPT

## 2022-06-25 PROCEDURE — 6370000000 HC RX 637 (ALT 250 FOR IP): Performed by: STUDENT IN AN ORGANIZED HEALTH CARE EDUCATION/TRAINING PROGRAM

## 2022-06-25 PROCEDURE — 6370000000 HC RX 637 (ALT 250 FOR IP): Performed by: NURSE PRACTITIONER

## 2022-06-25 PROCEDURE — 6360000002 HC RX W HCPCS: Performed by: SPECIALIST/TECHNOLOGIST

## 2022-06-25 PROCEDURE — 6370000000 HC RX 637 (ALT 250 FOR IP): Performed by: SPECIALIST/TECHNOLOGIST

## 2022-06-25 RX ORDER — LOSARTAN POTASSIUM 100 MG/1
100 TABLET ORAL DAILY
Qty: 30 TABLET | Refills: 0
Start: 2022-06-26

## 2022-06-25 RX ORDER — CETIRIZINE HYDROCHLORIDE 10 MG/1
10 TABLET ORAL DAILY
Status: DISCONTINUED | OUTPATIENT
Start: 2022-06-25 | End: 2022-06-25 | Stop reason: HOSPADM

## 2022-06-25 RX ORDER — DIAPER,BRIEF,INFANT-TODD,DISP
EACH MISCELLANEOUS 2 TIMES DAILY
Status: DISCONTINUED | OUTPATIENT
Start: 2022-06-25 | End: 2022-06-25 | Stop reason: HOSPADM

## 2022-06-25 RX ADMIN — POLYETHYLENE GLYCOL 3350 17 G: 17 POWDER, FOR SOLUTION ORAL at 08:20

## 2022-06-25 RX ADMIN — CETIRIZINE HYDROCHLORIDE 10 MG: 10 TABLET, FILM COATED ORAL at 15:08

## 2022-06-25 RX ADMIN — ESCITALOPRAM OXALATE 20 MG: 10 TABLET ORAL at 08:18

## 2022-06-25 RX ADMIN — SODIUM CHLORIDE, PRESERVATIVE FREE 10 ML: 5 INJECTION INTRAVENOUS at 08:20

## 2022-06-25 RX ADMIN — OXYCODONE 10 MG: 5 TABLET ORAL at 11:01

## 2022-06-25 RX ADMIN — METHOCARBAMOL TABLETS 500 MG: 750 TABLET, COATED ORAL at 08:18

## 2022-06-25 RX ADMIN — AMLODIPINE BESYLATE 5 MG: 5 TABLET ORAL at 08:18

## 2022-06-25 RX ADMIN — HYDROCORTISONE: 1 CREAM TOPICAL at 15:08

## 2022-06-25 RX ADMIN — DIPHENHYDRAMINE HCL 25 MG: 25 TABLET ORAL at 03:50

## 2022-06-25 RX ADMIN — ACETAMINOPHEN 650 MG: 325 TABLET ORAL at 03:50

## 2022-06-25 RX ADMIN — LOSARTAN POTASSIUM 100 MG: 25 TABLET, FILM COATED ORAL at 08:18

## 2022-06-25 RX ADMIN — ENOXAPARIN SODIUM 40 MG: 100 INJECTION SUBCUTANEOUS at 08:18

## 2022-06-25 ASSESSMENT — PAIN SCALES - GENERAL
PAINLEVEL_OUTOF10: 7

## 2022-06-25 NOTE — PLAN OF CARE
Problem: Discharge Planning  Goal: Discharge to home or other facility with appropriate resources  Outcome: Progressing     Problem: Pain  Goal: Verbalizes/displays adequate comfort level or baseline comfort level  6/25/2022 0825 by Delfino Gonzalez RN  Outcome: Progressing     Problem: Safety - Adult  Goal: Free from fall injury  6/25/2022 0825 by Delfino Gonzalez RN  Outcome: Progressing

## 2022-06-25 NOTE — PROGRESS NOTES
Occupational Therapy  Daily Treatment Note  Date: 2022  Patient Name: Kiesha Conn  :  1945  MRN: [id number]       Subjective   General  Patient assessed for rehabilitation services?: Yes  Diagnosis: Closed comminuted intertrochanteric fracture of proximal end of right femur, [facility]  General Comment  Comments: RN cleared pt for therapy evaluation and reporting pt received pain meds prior to therapy staff entry      Treatment Activities:    Pt SBA for transfers, mod A for LB ADLs, CGA for toileting. Activity Tolerance  Activity Tolerance: Patient tolerated treatment well  Cognition  Overall Cognitive Status: WFL        Assessment    Pt progressing well toward goals. Pt SBA fortransfers and mobility with RW, cues for safety with RW. Pt talkative and needs redirection. Pt cont to require assist for LB ADLs, cues for safety and steadying assist at times. Pt with no 24 hr assist, cont to rec SNF.      Discharge Recommendations: Subacute/Skilled Nursing Facility  Timed Code Treatment Minutes: 39 Minutes  Activity Tolerance  Activity Tolerance: Patient tolerated treatment well         Plan   Plan  Times per Week: 4-6x's a week while in acute care  Times per Day: Daily  Current Treatment Recommendations: Strengthening,Balance training,Functional mobility training,Endurance training,Pain management,Gait training,Safety education & training,Patient/Caregiver education & training,Equipment evaluation, education, & procurement,Home management training,Self-Care / ADL,Positioning       G-Code  AM-PAC Daily Activity Inpatient   How much help for putting on and taking off regular lower body clothing?: A Lot  How much help for Bathing?: A Lot  How much help for Toileting?: A Little  How much help for putting on and taking off regular upper body clothing?: A Little  How much help for taking care of personal grooming?: A Little  How much help for eating meals?: None  AM-PAC Inpatient Daily Activity Raw Score: 17  AM-PAC Inpatient ADL T-Scale Score : 37.26  ADL Inpatient CMS 0-100% Score: 50.11  ADL Inpatient CMS G-Code Modifier : CK     Goals  Short Term Goals  Time Frame for Short term goals: 1 week (6/25) unless noted  Short Term Goal 1: Pt will be formally assessed for functional transfers-- GOAL MET, pt CGA-min A 6/21/22  Short Term Goal 2: Pt will perform bed mobility with supv  Short Term Goal 3: Pt will perform x15 BUE exercises  Short Term Goal 4: Pt will perform 2-3 grooming tasks in stance with LRAD  Short Term Goal 5: Pt will tolerate standing for x5 mins in preparation for standing ADLs  Patient Goals   Patient goals : \"Get stronger\" -6/24 progressing    Therapy Time   Individual Concurrent Group Co-treatment   Time In 1347         Time Out 1426         Minutes 39         Timed Code Treatment Minutes: 44 Minutes      If pt discharges prior to next session, this note will serve as discharge summary. See case management note for discharge disposition.      Mary Addison, OT

## 2022-06-25 NOTE — CARE COORDINATION
CASE MANAGEMENT DISCHARGE SUMMARY      Discharge to: [facility] completed: yes  Hospital Exemption Notification (HENS) completed: yes    IMM given: (date)     New Durable Medical Equipment ordered/agency: na    Transportation:    Family/car: son    Confirmed discharge plan with: RN,Marjorie Marley Shone and son     Patient: yes       Facility/Agency, name:  [phone]   Phone number for report to facility: [phone]     RN, name: Loretta Durham    Note: Discharging nurse to complete [facility], reconcile [facility], and place final copy with patient's discharge packet. RN to ensure that written prescriptions for  Level II medications are sent with patient to the facility as per protocol.

## 2022-06-25 NOTE — DISCHARGE INSTR - DIET

## 2022-06-25 NOTE — PROGRESS NOTES
Hospitalist Progress Note      PCP: No primary care provider on file. Date of Admission: 6/18/2022    Chief Complaint: Fall, right hip pain    Hospital Course:   78 y.o. female who presented [facility] with above complaints  Patient with PMH of lung CA s/p resection, currently in remission, Hx of breast cancer s/p surgery, tobacco abuse, HTN, COPD presenting to the ED today with complaints of fall. Patient reports earlier today she was getting out of her car when she lost balance, tripped and fell on her right side with the brunt of the impact on her right hip. After the fall she had severe right hip and right groin pain due to which she was unable to stand or ambulate. No head trauma or loss of consciousness. No chest pain. Denies any neck pain or headache. Patient apparently had been drinking prior to the fall, she consumed 1 drink. Patient reports she is very active physically. Is able to walk a few blocks, climb a couple of flights of stairs easily without any symptoms. Subjective: has small ulcer on tip of tongue that is very painful.      Medications:  Reviewed    Infusion Medications    sodium chloride       Scheduled Medications    cetirizine  10 mg Oral Daily    hydrocortisone   Topical BID    polyethylene glycol  17 g Oral BID    enoxaparin  40 mg SubCUTAneous Daily    acetaminophen  650 mg Oral Q6H    methocarbamol  500 mg Oral TID    nicotine  1 patch TransDERmal Daily    sodium chloride flush  5-40 mL IntraVENous 2 times per day    escitalopram  20 mg Oral Daily    amLODIPine  5 mg Oral Daily    losartan  100 mg Oral Daily     PRN Meds: diphenhydrAMINE, benzocaine, phenol, albuterol sulfate HFA **AND** ipratropium, sodium chloride flush, sodium chloride, ondansetron **OR** ondansetron, oxyCODONE **OR** oxyCODONE    No intake or output data in the 24 hours ending 06/25/22 1243    Physical Exam Performed:  /80   Pulse 83   Temp 97.7 °F (36.5 °C) (Oral) Resp 16   Ht 5' 2\" (1.575 m)   Wt 150 lb 5.7 oz (68.2 kg)   SpO2 95%   BMI 27.50 kg/m²     General appearance: No apparent distress, appears stated age and cooperative. HEENT: Pupils equal, round, and reactive to light. Conjunctivae/corneas clear. Neck: Supple, with full range of motion. No jugular venous distention. Trachea midline. Respiratory:  Normal respiratory effort. Clear to auscultation, bilaterally without Rales/Wheezes/Rhonchi. Cardiovascular: Regular rate and rhythm with normal S1/S2 without murmurs, rubs or gallops. Abdomen: Soft, non-tender, non-distended with normal bowel sounds. Musculoskeletal: No clubbing, cyanosis or edema bilaterally. S/p Right Hip IM nail fixation   Skin: Skin color, texture, turgor normal.  No rashes or lesions. Neurologic:  Neurovascularly intact without any focal sensory/motor deficits. Cranial nerves: II-XII intact, grossly non-focal.  Psychiatric: Alert and oriented, thought content appropriate, normal insight  Capillary Refill: Brisk,< 3 seconds   Peripheral Pulses: +2 palpable, equal bilaterally       Labs:   No results for input(s): WBC, HGB, HCT, PLT in the last 72 hours. No results for input(s): NA, K, CL, CO2, BUN, CREATININE, CALCIUM, PHOS in the last 72 hours. Invalid input(s): MAGNES  No results for input(s): AST, ALT, BILIDIR, BILITOT, ALKPHOS in the last 72 hours. No results for input(s): INR in the last 72 hours. Radiology:  XR HIP 2-3 VW W PELVIS RIGHT   Final Result   Anatomic alignment status post ORIF right femoral neck         XR HIP RIGHT (2-3 VIEWS)   Final Result   Successful reduction of right femoral intertrochanteric fracture with   intramedullary nail system. FLUORO FOR SURGICAL PROCEDURES   Final Result      XR FEMUR RIGHT (MIN 2 VIEWS)   Final Result   Stable acute comminuted displaced intertrochanteric fracture of the proximal   right femur, similar to the study earlier today.   No additional femoral   fracture is identified. CT Head WO Contrast   Final Result   Chronic findings in the brain without acute CT abnormality identified. XR CHEST PORTABLE   Final Result   No acute cardiopulmonary abnormality. XR HIP RIGHT (2-3 VIEWS)   Final Result   Mildly displaced and angulated intertrochanteric right femur fracture. Active Hospital Problems    Diagnosis Date Noted    Closed comminuted intertrochanteric fracture of proximal end of right femur, initial encounter ([facility] Stephanie Garcia 06/18/2022     Priority: Medium    Tobacco use disorder [F17.200] 07/26/2016     Priority: Medium    Generalized anxiety disorder [F41.1] 07/26/2016     Priority: Medium    Essential hypertension [I10] 07/26/2016     Priority: Medium    Malignant neoplasm of lung ([facility] [C34.90] 03/05/2008     Priority: Medium     Assessment/Plan:  Right femur fracture  - orthopedic surgery consulted  - s/p OR for IM nail  - post-op management per surgery  - pain control ordered  - PT/OT    Tongue ulceration  - likely related to intubation  - topical pain control  - supportive care    HTN  - well controlled  - continue norvasc, losartan    COPD  - no evidence of exacerbation  - continue home inhalers    H/O lung cancer, breast cancer  - s/p lobectomy, bilateral mastectomy  - follow up with hem/onc as outpatient    Anxiety  - mood stable  - continue lexapro    Tobacco dependence  - counseling given. Nicotine replacement ordered    DVT Prophylaxis: Per Ortho   Diet: ADULT DIET; Regular  ADULT ORAL NUTRITION SUPPLEMENT; Breakfast, Dinner; Standard High Calorie/High Protein Oral Supplement  ADULT ORAL NUTRITION SUPPLEMENT; Lunch; Frozen Oral Supplement  Code Status: Full Code    PT/OT Eval Status: Will need    Dispo - SNF pending precert.  Medically ready for discharge    Palomo Davis MD

## 2022-06-25 NOTE — PROGRESS NOTES
Patient and family given discharge instructions. Wound clean dry and intact. Patient wheeled to car with all patient belongings. Pt son taking pt to [facility]. Report called to April at [facility]. IV removed without complications. All questions and concerns were addressed.

## 2022-06-25 NOTE — PROGRESS NOTES
Physical Therapy  Facility/Department: [facility] C5 - MED SURG/ORTHO  Daily Treatment Note  NAME: Kusum Marsh  : 1945  MRN: [id number]    Date of Service: 2022    Discharge Recommendations:  Subacute/Skilled Nursing Facility   PT Equipment Recommendations  Equipment Needed: No    Patient Diagnosis(es): The primary encounter diagnosis was Closed intertrochanteric fracture of hip, right, initial encounter ([facility] A diagnosis of Fall, initial encounter was also pertinent to this visit. Assessment   Assessment: Pt continues to require SBA for transfers and CGA for ambulation. Pt unsafe to return home as she lives alone. Recommend SNF for continued therapy. Activity Tolerance: Patient tolerated treatment well  Equipment Needed: No     Plan    Plan  Plan: 1 time a day 7 days a week  Specific Instructions for Next Treatment: Progress mobility as tolerated, assess t/f and gait if tolerated  Current Treatment Recommendations: Strengthening;Equipment evaluation, education, & procurement;Balance training;Gait training;Modalities;Stair training;Functional mobility training;Transfer training;Neuromuscular re-education;Home exercise program;Positioning; Safety education & training;Patient/Caregiver education & training; Therapeutic activities; Endurance training     Restrictions  Restrictions/Precautions  Restrictions/Precautions: Weight Bearing,Fall Risk  Lower Extremity Weight Bearing Restrictions  Right Lower Extremity Weight Bearing: Weight Bearing As Tolerated  Position Activity Restriction  Other position/activity restrictions: . Subjective    Subjective  Subjective: Pt agreeable to therapy. Reports having a rash on her back and her tongue is really hurting.   Pain: C/o minimal pain in R hip     Objective   Vitals  Heart Rate: 83  BP: 132/75  MAP (Calculated): 94  SpO2: 95 %  Bed Mobility Training  Bed Mobility Training: No  Balance  Sitting: Intact  Standing: Impaired  Standing - Static: Good  Standing - Dynamic: Fair  Transfer Training  Transfer Training: Yes  Sit to Stand: Stand-by assistance  Stand to Sit: Stand-by assistance  Gait Training  Gait Training: Yes  Right Side Weight Bearing: As tolerated  Gait  Overall Level of Assistance: Stand-by assistance  Speed/Alda: Slow  Gait Abnormalities: Decreased step clearance  Distance (ft): 10 Feet (+ 60')  Assistive Device: Walker, rolling     PT Exercises  Exercise Treatment: Seated ther ex: ankle pumps x15, LAQ x15, quad sets x15     Safety Devices  Type of Devices: Call light within reach;Nurse notified;Gait belt;Left in chair;Chair alarm in place       Goals  Short Term Goals  Time Frame for Short term goals: 1 week 6/26/22 (unless otherwise specified)  Short term goal 1: Pt will complete supine to/from sit with I  -6/24 sba/cga  Short term goal 2: Pt will complete sit to/from stand with LRAD with SBA   -6/24 sba  Short term goal 3: Pt will ambulate 48' with RW with SBA without LOB   -6/24 met. New: w/ mod indep  Short term goal 4: Pt will ascend/descend 4 steps with HR with CGA without LOB   -6/24 NT  Short term goal 5: 6/23/22: Pt will participate in 12-15 reps BLE exercises to increase strength and increase I with functional mobility and gait   -6/24  met, on-going  Patient Goals   Patient goals :  \"Get stronger\" -6/24 progressing    Education  Patient Education  Education Given To: Patient  Education Provided: Role of Therapy;Plan of Care;Precautions;Transfer Training  Education Provided Comments: safety with mobility  Education Method: Demonstration;Verbal  Barriers to Learning: None  Education Outcome: Verbalized understanding;Demonstrated understanding    Therapy Time   Individual Concurrent Group Co-treatment   Time In 1012         Time Out 1035         Minutes 23         Timed Code Treatment Minutes: [address] [facility],   [facility]

## 2022-06-26 NOTE — DISCHARGE SUMMARY
Hospital Medicine Discharge Summary    Patient ID: Mirella Keys      Patient's PCP: No primary care provider on file. Admit Date: 6/18/2022     Discharge Date: 6/25/2022     Admitting Provider: Juaquin Bull MD     Discharge Provider: Chantal Shahid MD     Discharge Diagnoses: Active Hospital Problems    Diagnosis     Closed comminuted intertrochanteric fracture of proximal end of right femur, [facility] [S72.141A]      Priority: Medium    Tobacco use disorder [F17.200]      Priority: Medium    Generalized anxiety disorder [F41.1]      Priority: Medium    Essential hypertension [I10]      Priority: Medium    Malignant neoplasm of lung ([facility] [C34.90]      Priority: Medium       The patient was seen and examined on day of discharge and this discharge summary is in conjunction with any daily progress note from day of discharge. Hospital Course:   78 y. o. female who presented to [facility] with above complaints  Patient with PMH of lung CA s/p resection, currently in remission, Hx of breast cancer s/p surgery, tobacco abuse, HTN, COPD presenting to the ED today with complaints of fall.  Patient reports earlier today she was getting out of her car when she lost balance, tripped and fell on her right side with the brunt of the impact on her right hip.  After the fall she had severe right hip and right groin pain due to which she was unable to stand or ambulate.  No head trauma or loss of consciousness.  No chest pain.  Denies any neck pain or headache.  Patient apparently had been drinking prior to the fall, she consumed 1 drink.   Patient reports she is very active physically.  Is able to walk a few blocks, climb a couple of flights of stairs easily without any symptoms.     Right femur fracture  - orthopedic surgery consulted  - s/p OR for IM nail  - post-op management per surgery  - pain control ordered  - PT/OT     Tongue ulceration  - likely related to intubation  - topical pain control  - supportive care     HTN  - well controlled  - continue norvasc, losartan     COPD  - no evidence of exacerbation  - continue home inhalers     H/O lung cancer, breast cancer  - s/p lobectomy, bilateral mastectomy  - follow up with hem/onc as outpatient     Anxiety  - mood stable  - continue lexapro     Tobacco dependence  - counseling given. Nicotine replacement ordered    Physical Exam Performed:     /80   Pulse 83   Temp 97.7 °F (36.5 °C) (Oral)   Resp 16   Ht 5' 2\" (1.575 m)   Wt 150 lb 5.7 oz (68.2 kg)   SpO2 95%   BMI 27.50 kg/m²       General appearance: No apparent distress, appears stated age and cooperative. HEENT: Pupils equal, round, and reactive to light. Conjunctivae/corneas clear. Neck: Supple, with full range of motion. No jugular venous distention. Trachea midline. Respiratory:  Normal respiratory effort. Clear to auscultation, bilaterally without Rales/Wheezes/Rhonchi. Cardiovascular: Regular rate and rhythm with normal S1/S2 without murmurs, rubs or gallops. Abdomen: Soft, non-tender, non-distended with normal bowel sounds. Musculoskeletal: No clubbing, cyanosis or edema bilaterally. S/p Right Hip IM nail fixation   Skin: Skin color, texture, turgor normal.  No rashes or lesions. Neurologic:  Neurovascularly intact without any focal sensory/motor deficits. Cranial nerves: II-XII intact, grossly non-focal.  Psychiatric: Alert and oriented, thought content appropriate, normal insight  Capillary Refill: Brisk,< 3 seconds   Peripheral Pulses: +2 palpable, equal bilaterally     Labs:  For convenience and continuity at follow-up the following most recent labs are provided:      CBC:    Lab Results   Component Value Date    WBC 7.7 06/22/2022    HGB 12.1 06/22/2022    HCT 36.4 06/22/2022     06/22/2022       Renal:    Lab Results   Component Value Date     06/22/2022    K 3.8 06/22/2022    K 4.0 06/18/2022     06/22/2022    CO2 27 06/22/2022 BUN 10 06/22/2022    CREATININE 0.6 06/22/2022    CALCIUM 9.7 06/22/2022         Significant Diagnostic Studies    Radiology:   XR HIP 2-3 VW W PELVIS RIGHT   Final Result   Anatomic alignment status post ORIF right femoral neck         XR HIP RIGHT (2-3 VIEWS)   Final Result   Successful reduction of right femoral intertrochanteric fracture with   intramedullary nail system. FLUORO FOR SURGICAL PROCEDURES   Final Result      XR FEMUR RIGHT (MIN 2 VIEWS)   Final Result   Stable acute comminuted displaced intertrochanteric fracture of the proximal   right femur, similar to the study earlier today. No additional femoral   fracture is identified. CT Head WO Contrast   Final Result   Chronic findings in the brain without acute CT abnormality identified. XR CHEST PORTABLE   Final Result   No acute cardiopulmonary abnormality. XR HIP RIGHT (2-3 VIEWS)   Final Result   Mildly displaced and angulated intertrochanteric right femur fracture. Consults:     IP CONSULT TO ORTHOPEDIC SURGERY  IP CONSULT TO HOSPITALIST  IP CONSULT TO SOCIAL WORK    Disposition:  [facility]     Condition at Discharge: Stable    Discharge Instructions/Follow-up:  Follow up with PCP, ortho surgery within 1-2 weeks    Code Status:  Full code    Activity: activity as tolerated    Diet: regular diet      Discharge Medications:     Discharge Medication List as of 6/25/2022  4:28 PM           Details   losartan (COZAAR) 100 MG tablet Take 1 tablet by mouth daily, Disp-30 tablet, R-0NO PRINT      oxyCODONE (ROXICODONE) 5 MG immediate release tablet Take 1 tablet by mouth every 6 hours as needed for Pain for up to 3 days. , Disp-12 tablet, R-0Print      polyethylene glycol (GLYCOLAX) 17 g packet Take 17 g by mouth daily for 10 days, Disp-10 each, R-0NO PRINT      enoxaparin (LOVENOX) 40 MG/0.4ML Inject 0.4 mLs into the skin daily for 30 doses, Disp-12 mL, R-0NO PRINT      methocarbamol (ROBAXIN) 500 MG tablet Take 1 tablet by mouth 3 times daily for 10 days, Disp-30 tablet, R-0NO PRINT              Details   escitalopram (LEXAPRO) 10 MG tablet Take 10 mg by mouth dailyHistorical Med      amLODIPine (NORVASC) 2.5 MG tablet Take 2.5 mg by mouth dailyHistorical Med      vitamin D3 (CHOLECALCIFEROL) 10 MCG (400 UNIT) TABS tablet Take 400 Units by mouth dailyHistorical Med      Misc Natural Products (GLUCOSAMINE CHOND CMP ADVANCED) TABS Take 1 tablet by mouthHistorical Med             Time Spent on discharge is more than 30 minutes in the examination, evaluation, counseling and review of medications and discharge plan. Signed:    Laurie Beach MD   6/26/2022      Thank you No primary care provider on file. for the opportunity to be involved in this patient's care. If you have any questions or concerns, please feel free to contact me at [phone].

## 2022-07-08 ENCOUNTER — OFFICE VISIT (OUTPATIENT)
Dept: ORTHOPEDIC SURGERY | Age: 77
End: 2022-07-08

## 2022-07-08 VITALS — WEIGHT: 150 LBS | BODY MASS INDEX: 27.6 KG/M2 | HEIGHT: 62 IN

## 2022-07-08 DIAGNOSIS — S72.141A CLOSED COMMINUTED INTERTROCHANTERIC FRACTURE OF PROXIMAL END OF RIGHT FEMUR, INITIAL ENCOUNTER (HCC): Primary | ICD-10-CM

## 2022-07-08 PROCEDURE — 99024 POSTOP FOLLOW-UP VISIT: CPT | Performed by: STUDENT IN AN ORGANIZED HEALTH CARE EDUCATION/TRAINING PROGRAM

## 2022-07-08 RX ORDER — METHOCARBAMOL 750 MG/1
750 TABLET, FILM COATED ORAL 3 TIMES DAILY
Qty: 90 TABLET | Refills: 0 | Status: SHIPPED | OUTPATIENT
Start: 2022-07-08 | End: 2022-08-07

## 2022-07-08 RX ORDER — OXYCODONE HYDROCHLORIDE AND ACETAMINOPHEN 5; 325 MG/1; MG/1
1 TABLET ORAL EVERY 6 HOURS PRN
Qty: 28 TABLET | Refills: 0 | Status: SHIPPED | OUTPATIENT
Start: 2022-07-08 | End: 2022-07-15

## 2022-07-08 NOTE — PROGRESS NOTES
Chief Complaint  Hip Pain (PO RT Hip SX)      History of Present Illness:  Gigi Mari is a pleasant 78 y.o. female the patient is here today for first postop evaluation regarding her right hip. The patient sustained a basicervical intertrochanteric femoral fracture on the right side 3 weeks ago and underwent a right hip intramedullary nailing on 6/19/2022. Denies any setbacks. She reports appropriate tenderness to the hip and pain with walking. The patient [facility] and is about to go home this weekend. She has home therapy coming to her after this. Medical History:  Patient's medications, allergies, past medical, surgical, social and family histories were reviewed and updated as appropriate. Pertinent items are noted in HPI  Review of systems reviewed from Patient History Form dated on 7/8/22 and available in the patient's chart under the Media tab. Vital Signs: There were no vitals filed for this visit. Constitutional: In no apparent distress. Normal affect. Alert and oriented X3 and is cooperative. Right hip Examination:    Well-healing surgical incisions to the hip. No signs of infection. No dehiscence noted. No purulence noted. No palpable fluctuance noted. Sensation intact from the L4-S1 distribution without paresthesias. Patient is ambulating with a walker assist.        Radiology:       Findings:   Views: 2 views right hip  Weight bearing: No   Findings: 2 views of the right hip taken in the office today demonstrate maintained alignment of an intertrochanteric femur fracture with stable alignment. Stable orthopedic hardware. No displacement noted. Previous comparison films: 6/19/2022    Impression:  1.   Stable right hip intertrochanteric femur fracture with stable orthopedic hardware           Assessment : 62-year-old female 3 weeks status post right hip intertrochanteric femur fracture intramedullary nailing, date of procedure 6/19/2022    Impression:  Encounter Diagnosis   Name Primary?  Closed comminuted intertrochanteric fracture of proximal end of right femur, initial encounter ([address] Yes       Office Procedures:  No orders of the defined types were placed in this encounter. Plan:     I will refill the patient's Percocet and Robaxin. The patient can continue with home therapy. She is doing well. 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 She may even need some outpatient therapy to continue strengthening. Follow-up in 1 month with repeat x-rays of the right hip. Glo Jean Baptiste is in agreement with this plan. All questions were answered to patient's satisfaction and was encouraged to call with any further questions. Jimbo Bacon, DO  Orthopedic Surgery and Sports Medicine  7/8/2022      This dictation was performed with a verbal recognition program [facility]) and it was checked for errors. It is possible that there are still dictated errors within this office note. If so, please bring any errors to my attention for an addendum. All efforts were made to ensure that this office note is accurate.

## 2022-07-29 DIAGNOSIS — S72.141A CLOSED COMMINUTED INTERTROCHANTERIC FRACTURE OF PROXIMAL END OF RIGHT FEMUR, INITIAL ENCOUNTER (HCC): Primary | ICD-10-CM

## 2022-07-29 RX ORDER — HYDROCODONE BITARTRATE AND ACETAMINOPHEN 5; 325 MG/1; MG/1
1 TABLET ORAL EVERY 6 HOURS PRN
Qty: 28 TABLET | Refills: 0 | Status: SHIPPED | OUTPATIENT
Start: 2022-07-29 | End: 2022-08-05

## 2022-07-29 NOTE — PROGRESS NOTES
Hydrocodone sent to her pharmacy and replacement of her oxycodone as she is not tolerating this.     Alma Correa, DO

## 2022-08-05 ENCOUNTER — TELEPHONE (OUTPATIENT)
Dept: ORTHOPEDIC SURGERY | Age: 77
End: 2022-08-05

## 2022-08-05 ENCOUNTER — OFFICE VISIT (OUTPATIENT)
Dept: ORTHOPEDIC SURGERY | Age: 77
End: 2022-08-05

## 2022-08-05 VITALS — WEIGHT: 135 LBS | HEIGHT: 62 IN | BODY MASS INDEX: 24.84 KG/M2

## 2022-08-05 DIAGNOSIS — S72.141A CLOSED COMMINUTED INTERTROCHANTERIC FRACTURE OF PROXIMAL END OF RIGHT FEMUR, INITIAL ENCOUNTER (HCC): ICD-10-CM

## 2022-08-05 DIAGNOSIS — M25.551 RIGHT HIP PAIN: Primary | ICD-10-CM

## 2022-08-05 PROCEDURE — 99024 POSTOP FOLLOW-UP VISIT: CPT | Performed by: STUDENT IN AN ORGANIZED HEALTH CARE EDUCATION/TRAINING PROGRAM

## 2022-08-05 RX ORDER — HYDROCODONE BITARTRATE AND ACETAMINOPHEN 5; 325 MG/1; MG/1
1 TABLET ORAL EVERY 6 HOURS PRN
Qty: 28 TABLET | Refills: 0 | Status: SHIPPED | OUTPATIENT
Start: 2022-08-05 | End: 2022-08-12

## 2022-08-05 NOTE — TELEPHONE ENCOUNTER
Faxing Patient Information     Facility Name: [address] [place] [address] PT  Contact Name: Gama Cardona  Contact Number: [phone]  Facility Fax Number: [phone]     SON CALLING AND STATES THE PT WILL BE SEEING OUT PATIENT PT.      PLEASE SEND ORDER TO [facility]    FAX: [phone]  ATTEN: Yoel Johnson

## 2022-08-05 NOTE — PROGRESS NOTES
Chief Complaint  Follow-up (PO R HIP)      History of Present Illness: The patient is here today for second evaluation for her right hip. She underwent a right hip intramedullary nailing on 6/19/2022 6 weeks ago. She denies any major setbacks or complications. [facility]. 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 HPI 7/8/2022:  Branden Adrian is a pleasant 78 y.o. female the patient is here today for first postop evaluation regarding her right hip. The patient sustained a basicervical intertrochanteric femoral fracture on the right side 3 weeks ago and underwent a right hip intramedullary nailing on 6/19/2022. Denies any setbacks. She reports appropriate tenderness to the hip and pain with walking. The patient has been in a nursing home rehab facility in [facility] and is about to go home this weekend. She has home therapy coming to her after this. Medical History:  Patient's medications, allergies, past medical, surgical, social and family histories were reviewed and updated as appropriate. Pertinent items are noted in HPI  Review of systems reviewed from Patient History Form dated on 8/5/22 and available in the patient's chart under the Media tab. Vital Signs: There were no vitals filed for this visit. Constitutional: In no apparent distress. Normal affect. Alert and oriented X3 and is cooperative. Right hip Examination:    Well-healing surgical incisions to the hip. No signs of infection. No dehiscence noted. No purulence noted. No palpable fluctuance noted. Sensation intact from the L4-S1 distribution without paresthesias. Patient was able to stand on her own without pain or difficulty. She is walking with a cane today.         Radiology:       Findings:   Views: 2 views right hip  Weight bearing: No   Findings: 2 views of the right hip taken in the office today demonstrate maintained alignment of an intertrochanteric femur fracture with stable alignment. Stable orthopedic hardware. No displacement noted. Interval healing noted compared to prior films. Previous comparison films: 7/8/22    Impression:  1. Stable right hip intertrochanteric femur fracture with stable orthopedic hardware and interval healing           Assessment : 74-year-old female 6 weeks status post right hip intertrochanteric femur fracture intramedullary nailing, date of procedure 6/19/2022    Impression:  Encounter Diagnoses   Name Primary? Right hip pain Yes    Closed comminuted intertrochanteric fracture of proximal end of right femur, initial encounter ([facility] [address]        Office Procedures:  Orders Placed This Encounter   Procedures    XR HIP RIGHT (2-3 VIEWS)     Standing Status:   Future     Number of Occurrences:   1     Standing Expiration Date:   8/3/2023     Order Specific Question:   Reason for exam:     Answer:   right hip pain         Plan:     Overall the patient is progressing nicely. She has high goals and I think that higher level of physical therapy on an outpatient basis is necessary. She is going to go to a place close to where she is living with her son in [facility]. This is outside of [facility]. I gave her some recommendations and restrictions. She can return to driving. I will give her 1 final refill of her hydrocodone and asked her not to drive if she is on the medication. I will see her back in 6 weeks for final x-rays. Alfred Aviles is in agreement with this plan. All questions were answered to patient's satisfaction and was encouraged to call with any further questions. Kalpesh Corrales DO  Orthopedic Surgery and Sports Medicine  8/5/2022      This dictation was performed with a verbal recognition program [facility]) and it was checked for errors. It is possible that there are still dictated errors within this office note. If so, please bring any errors to my attention for an addendum.   All efforts were made to ensure that this office note is accurate.

## 2022-08-09 DIAGNOSIS — S72.141A CLOSED COMMINUTED INTERTROCHANTERIC FRACTURE OF PROXIMAL END OF RIGHT FEMUR, INITIAL ENCOUNTER (HCC): Primary | ICD-10-CM

## 2022-08-09 NOTE — PROGRESS NOTES
PT ORDER AND OP NOTE WERE FAXED OVER TO [facility]) PER REQUEST FOR EXTERNAL PT REFERRAL FOR PATIENT.

## 2022-10-06 ENCOUNTER — OFFICE VISIT (OUTPATIENT)
Dept: ORTHOPEDIC SURGERY | Age: 77
End: 2022-10-06
Payer: MEDICARE

## 2022-10-06 VITALS — WEIGHT: 135 LBS | HEIGHT: 62 IN | BODY MASS INDEX: 24.84 KG/M2

## 2022-10-06 DIAGNOSIS — M25.551 RIGHT HIP PAIN: Primary | ICD-10-CM

## 2022-10-06 DIAGNOSIS — S72.141A CLOSED COMMINUTED INTERTROCHANTERIC FRACTURE OF PROXIMAL END OF RIGHT FEMUR, INITIAL ENCOUNTER (HCC): ICD-10-CM

## 2022-10-06 PROCEDURE — 1123F ACP DISCUSS/DSCN MKR DOCD: CPT | Performed by: STUDENT IN AN ORGANIZED HEALTH CARE EDUCATION/TRAINING PROGRAM

## 2022-10-06 PROCEDURE — 99213 OFFICE O/P EST LOW 20 MIN: CPT | Performed by: STUDENT IN AN ORGANIZED HEALTH CARE EDUCATION/TRAINING PROGRAM

## 2022-10-06 NOTE — PROGRESS NOTES
Chief Complaint  Hip Pain (F/U RIGHT HIP)      History of Present Illness: The patient is here today for repeat evaluation of her right hip. The patient overall is doing okay but she continues to have mid thigh pain especially when she does biking or other higher level therapy exercises to try to get off of her walker. She still has a little bit of a limp but it is concerning her. She has several questions today regarding her long-term prognosis of hip fractures normally and if she should get looked into having weaker bone. She does report that she has gotten DEXA scans in the past for monitoring. Prior HPI 8/5/2022:  The patient is here today for second evaluation for her right hip. She underwent a right hip intramedullary nailing on 6/19/2022 6 weeks ago. She denies any major setbacks or complications. [facility]. 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 HPI 7/8/2022:  Yesica Caba is a pleasant 78 y.o. female the patient is here today for first postop evaluation regarding her right hip. The patient sustained a basicervical intertrochanteric femoral fracture on the right side 3 weeks ago and underwent a right hip intramedullary nailing on 6/19/2022. Denies any setbacks. She reports appropriate tenderness to the hip and pain with walking. The patient has been in a nursing home rehab facility in [facility] and is about to go home this weekend. She has home therapy coming to her after this. Pain Assessment  Location of Pain: Hip  Location Modifiers: Right  Severity of Pain: 3  Quality of Pain: Sharp, Other (Comment), Aching  Duration of Pain: Persistent  Frequency of Pain: Constant  Aggravating Factors: Other (Comment), Exercise, Walking  Limiting Behavior: Yes  Relieving Factors:  Other (Comment), Rest, Exercise  Result of Injury: Yes  Work-Related Injury: No  Are there other pain locations you wish to document?: No    Medical History:  Patient's medications, allergies, past medical, surgical, social and family histories were reviewed and updated as appropriate. Pertinent items are noted in HPI  Review of systems reviewed from Patient History Form dated on 10/6/22 and available in the patient's chart under the Media tab. Vital Signs: There were no vitals filed for this visit. Constitutional: In no apparent distress. Normal affect. Alert and oriented X3 and is cooperative. Right hip Examination:    Well-healing surgical incisions to the hip. No signs of infection. No dehiscence noted. No purulence noted. No palpable fluctuance noted. Sensation intact from the L4-S1 distribution without paresthesias. Patient was able to stand on her own without pain or difficulty. She is walking with a cane today. Tender to the mid thigh with resisted knee extension. Radiology:       2 views of the right hip taken in the office today demonstrate a stable intertrochanteric femur fracture with good healing and maintained alignment. No hardware complication to the intramedullary nail         Assessment : 82-year-old female 3 and 1/2 months status post right hip intertrochanteric femur fracture intramedullary nailing, date of procedure 6/19/2022    Impression:  Encounter Diagnoses   Name Primary? Right hip pain Yes    Closed comminuted intertrochanteric fracture of proximal end of right femur, initial encounter ([facility] [address]        Office Procedures:  Orders Placed This Encounter   Procedures    XR HIP RIGHT (2-3 VIEWS)     Standing Status:   Future     Number of Occurrences:   1     Standing Expiration Date:   9/30/2023     Order Specific Question:   Reason for exam:     Answer:   right hip pain         Plan:     Unfortunately I believe the patient has done a little too much with physical therapy and is starting to have implant/bone interface pain secondary to the stiffness difference.   I also discussed with her that she may forever need the cane as sometimes these injuries decrease patient's physical capabilities. I did recommend she avoid any type of aggressive thigh work for the next 4 to 6 weeks to allow healing and strengthening of her bone. However given her overall health profile I believe she will still be well in the long run. I did recommend she talk to her PCP about her bone density as she did suffer this fragility fracture. Follow-up with me in 6 weeks to check her progress  No x-rays unless she continues to have pain. Chintan Lucio is in agreement with this plan. All questions were answered to patient's satisfaction and was encouraged to call with any further questions. Judge Reyes, DO  Orthopedic Surgery and Sports Medicine  10/6/2022      This dictation was performed with a verbal recognition program [facility]) and it was checked for errors. It is possible that there are still dictated errors within this office note. If so, please bring any errors to my attention for an addendum. All efforts were made to ensure that this office note is accurate.

## 2022-11-18 ENCOUNTER — OFFICE VISIT (OUTPATIENT)
Dept: ORTHOPEDIC SURGERY | Age: 77
End: 2022-11-18
Payer: MEDICARE

## 2022-11-18 VITALS — BODY MASS INDEX: 24.84 KG/M2 | HEIGHT: 62 IN | WEIGHT: 135 LBS

## 2022-11-18 DIAGNOSIS — S72.141A CLOSED COMMINUTED INTERTROCHANTERIC FRACTURE OF PROXIMAL END OF RIGHT FEMUR, INITIAL ENCOUNTER (HCC): Primary | ICD-10-CM

## 2022-11-18 PROCEDURE — 1123F ACP DISCUSS/DSCN MKR DOCD: CPT | Performed by: STUDENT IN AN ORGANIZED HEALTH CARE EDUCATION/TRAINING PROGRAM

## 2022-11-18 PROCEDURE — 99213 OFFICE O/P EST LOW 20 MIN: CPT | Performed by: STUDENT IN AN ORGANIZED HEALTH CARE EDUCATION/TRAINING PROGRAM

## 2022-11-18 NOTE — PROGRESS NOTES
Chief Complaint  Hip Pain (R hip f/u)      History of Present Illness: The patient is here today for repeat evaluation of her right hip. The patient is 5 months out from her right hip fracture. The patient is doing a lot better since last time I saw her. She is only needing a small amount of Aleve to help with her pain. She is doing home exercises with good results. She is reporting a little bit of alopecia and she is going to go see her general practitioner for this. Prior HPI 10/6/2022:  The patient is here today for repeat evaluation of her right hip. The patient overall is doing okay but she continues to have mid thigh pain especially when she does biking or other higher level therapy exercises to try to get off of her walker. She still has a little bit of a limp but it is concerning her. She has several questions today regarding her long-term prognosis of hip fractures normally and if she should get looked into having weaker bone. She does report that she has gotten DEXA scans in the past for monitoring. Prior HPI 8/5/2022:  The patient is here today for second evaluation for her right hip. She underwent a right hip intramedullary nailing on 6/19/2022 6 weeks ago. She denies any major setbacks or complications. [facility] 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 HPI 7/8/2022:  Nitin Davis is a pleasant 78 y.o. female the patient is here today for first postop evaluation regarding her right hip. The patient sustained a basicervical intertrochanteric femoral fracture on the right side 3 weeks ago and underwent a right hip intramedullary nailing on 6/19/2022. Denies any setbacks. She reports appropriate tenderness to the hip and pain with walking. The patient has been in a nursing home rehab facility in [facility] and is about to go home this weekend. She has home therapy coming to her after this.     Pain Assessment  Location of Pain: Hip  Location Modifiers: Right  Severity of Pain: 2  Quality of Pain: Aching  Duration of Pain: A few minutes  Frequency of Pain: Several times daily  Limiting Behavior: No  Relieving Factors: Rest, Nsaids  Result of Injury: Yes  Work-Related Injury: No  Are there other pain locations you wish to document?: No    Medical History:  Patient's medications, allergies, past medical, surgical, social and family histories were reviewed and updated as appropriate. Pertinent items are noted in HPI  Review of systems reviewed from Patient History Form dated on 11/18/22 and available in the patient's chart under the Media tab. Vital Signs: There were no vitals filed for this visit. Constitutional: In no apparent distress. Normal affect. Alert and oriented X3 and is cooperative. Right hip Examination:    Minimally tender to palpation about the hip. Patient able to stand and walk on her own power with minimal pain. Minimal tenderness to the mid thigh. Radiology:       No new x-rays today. Assessment : 30-year-old female 5 months status post right hip intertrochanteric femur fracture intramedullary nailing, date of procedure 6/19/2022    Impression:  Encounter Diagnosis   Name Primary? Closed comminuted [facility]chanter[facility] of proximal end of right femur[facility] Yes       Office Procedures:  No orders of the defined types were placed in this encounter. Plan: At this point the patient is greatly improved from her prior visit. I believe the patient will continue to improve as she does her home exercises and rebuild her physical endurance. I do think it is antunez for her to see her PCP to discuss the alopecia. I cannot think of a specific cause for this at the moment. Follow-up in 2 months for repeat x-rays of the right hip    . Catalina Herring is in agreement with this plan.  All questions were answered to patient's satisfaction and was encouraged to call with any further questions. Kailash Garcia, DO  Orthopedic Surgery and Sports Medicine  11/18/2022      This dictation was performed with a verbal recognition program [facility]) and it was checked for errors. It is possible that there are still dictated errors within this office note. If so, please bring any errors to my attention for an addendum. All efforts were made to ensure that this office note is accurate.

## 2023-01-20 ENCOUNTER — OFFICE VISIT (OUTPATIENT)
Dept: ORTHOPEDIC SURGERY | Age: 78
End: 2023-01-20

## 2023-01-20 VITALS — HEIGHT: 62 IN | BODY MASS INDEX: 24.84 KG/M2 | WEIGHT: 135 LBS

## 2023-01-20 DIAGNOSIS — S72.141A CLOSED COMMINUTED INTERTROCHANTERIC FRACTURE OF PROXIMAL END OF RIGHT FEMUR, INITIAL ENCOUNTER (HCC): Primary | ICD-10-CM

## 2023-01-20 NOTE — PROGRESS NOTES
Chief Complaint  Hip Pain (CK Rt hip)      History of Present Illness: The patient is here today for her 7-month evaluation for her right hip. She is 7 months out from right hip intramedullary nailing. Overall she is doing well and has returned to most activities but if she is on it for a long period of time she does have a good amount of pain on the hip and also when she lays on that side. She has been taking Aleve but it is starting to affect her stomach and she is requesting another medication. Prior HPI 11/18/2022:  The patient is here today for repeat evaluation of her right hip. The patient is 5 months out from her right hip fracture. The patient is doing a lot better since last time I saw her. She is only needing a small amount of Aleve to help with her pain. She is doing home exercises with good results. She is reporting a little bit of alopecia and she is going to go see her general practitioner for this. Prior HPI 10/6/2022:  The patient is here today for repeat evaluation of her right hip. The patient overall is doing okay but she continues to have mid thigh pain especially when she does biking or other higher level therapy exercises to try to get off of her walker. She still has a little bit of a limp but it is concerning her. She has several questions today regarding her long-term prognosis of hip fractures normally and if she should get looked into having weaker bone. She does report that she has gotten DEXA scans in the past for monitoring. Prior HPI 8/5/2022:  The patient is here today for second evaluation for her right hip. She underwent a right hip intramedullary nailing on 6/19/2022 6 weeks ago. She denies any major setbacks or complications. She is going to start doing outpatient therapy at [facility] rehab. 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     HPI 7/8/2022:  Keisha Longo is a pleasant 70 y.o. female the patient is here today for first postop evaluation regarding her right hip. The patient sustained a basicervical intertrochanteric femoral fracture on the right side 3 weeks ago and underwent a right hip intramedullary nailing on 6/19/2022. Denies any setbacks. She reports appropriate tenderness to the hip and pain with walking. The patient has been in a nursing home rehab facility in [facility] and is about to go home this weekend. She has home therapy coming to her after this. Medical History:  Patient's medications, allergies, past medical, surgical, social and family histories were reviewed and updated as appropriate. Pertinent items are noted in HPI  Review of systems reviewed from Patient History Form dated on 1/20/23 and available in the patient's chart under the Media tab. Vital Signs: There were no vitals filed for this visit. Constitutional: In no apparent distress. Normal affect. Alert and oriented X3 and is cooperative. Right hip Examination:    \Patient able to stand and walk on her own power with minimal pain. Minimal tenderness to the mid thigh. Tender to palpation over the greater trochanteric bursal area today. Radiology:       2 views of the right hip taken in the office today demonstrate healed intertrochanteric femur fracture with stable nail fixation and slight increased offset. Assessment : 68-year-old female 7 months status post right hip intertrochanteric femur fracture intramedullary nailing, date of procedure 6/19/2022    Impression:  Encounter Diagnosis   Name Primary? Closed comminuted intertrochanteric fracture of proximal end of right femur, initial encounter ([facility] Yes       Office Procedures:  Orders Placed This Encounter   Procedures    XR HIP RIGHT (2-3 VIEWS)     Standing Status:   Future     Number of Occurrences:   1     Standing Expiration Date:   1/19/2024           Plan:     I will start the patient on Voltaren to take as needed in place of the Aleve.   I stressed that she take it with food. Follow-up in 6 months for 1 year x-rays. If she is doing well at that point and I will follow her as needed. The patient was advised that NSAID-type medications have several potential side effects that include: gastrointestinal irritation including hemorrhage, renal injury, as well as an increased risk for heart attack and stroke. The patient was asked to take the medication with food and to stop if there is any symptoms of GI upset, including heartburn, nausea, increased gas or diarrhea. I asked the patient to contact their medical provider for vomiting, abdominal pain or black/bloody stools. The patient should have renal function testing per his medical provider periodically if the medication is taken on a regular basis. The patient should be alert for any swelling in the lower extremities and should stop taking the medication immediately and contact their medical provider should this occur. In addition, the patient should stop taking the medication immediately and contact their medical provider should there be any shortness of breath, fatigue and be evaluated in an emergency facility for any chest pain. The patient expresses understanding of these issues and questions were answered. Ella Barnard is in agreement with this plan. All questions were answered to patient's satisfaction and was encouraged to call with any further questions. Mello Dent, DO  Orthopedic Surgery and Sports Medicine  1/20/2023      This dictation was performed with a verbal recognition program [facility]) and it was checked for errors. It is possible that there are still dictated errors within this office note. If so, please bring any errors to my attention for an addendum. All efforts were made to ensure that this office note is accurate.

## 2023-07-21 ENCOUNTER — OFFICE VISIT (OUTPATIENT)
Dept: ORTHOPEDIC SURGERY | Age: 78
End: 2023-07-21

## 2023-07-21 VITALS — HEIGHT: 62 IN | WEIGHT: 135 LBS | BODY MASS INDEX: 24.84 KG/M2

## 2023-07-21 DIAGNOSIS — M25.551 RIGHT HIP PAIN: Primary | ICD-10-CM

## 2023-07-21 DIAGNOSIS — M70.62 GREATER TROCHANTERIC BURSITIS OF LEFT HIP: ICD-10-CM

## 2023-07-21 DIAGNOSIS — S72.141A CLOSED COMMINUTED INTERTROCHANTERIC FRACTURE OF PROXIMAL END OF RIGHT FEMUR, INITIAL ENCOUNTER (HCC): ICD-10-CM

## 2023-07-21 NOTE — PROGRESS NOTES
Chief Complaint  Hip Pain (LEFT HIP)      History of Present Illness:  Magy Diaz is a pleasant 70 y.o. female here today for her 1 year follow-up for her right hip fracture as well as evaluation of her left hip. The patient reports her right hip is doing very well. She is very pleased with her progress. However recently she has noted worsening pain to the left hip. She usually sleeps on the right hip but due to her hip fracture it has been uncomfortable, so she has been sleeping more on her left side. The hip has been bothering her on the lateral aspect of the hip and she is worried that it might be arthritis. Pain Assessment  Location of Pain: Hip  Location Modifiers: Left  Severity of Pain: 8  Quality of Pain: Aching  Duration of Pain: Persistent  Frequency of Pain: Constant    Medical History:  Patient's medications, allergies, past medical, surgical, social and family histories were reviewed and updated as appropriate. Pertinent items are noted in HPI  Review of systems reviewed from Patient History Form dated on 7/21/23 and available in the patient's chart under the Media tab. Vital Signs: There were no vitals filed for this visit. Constitutional: In no apparent distress. Normal affect. Alert and oriented X3 and is cooperative. Bilateral hip Examination:    Gait: Normal     Skin: There are no rashes, ulcerations or lesions. Inspection:  No erythema swelling or signs of infection. Leg lengths symmetric. No evidence of hip flexion contracture. Palpation: Tenderness noted over the greater trochanteric bursa of the left hip only. Nontender over the gluteus medius. .  No palpable masses. Range of Motion: Full pain-free range of motion. Strength:  5/5 hip flexion and abduction and adduction. Appears symmetric. Stability: No subluxation. Vascular: Skin warm dry and well perfused. No calf tenderness. Neurologic: No focal motor deficits.   Sensation

## 2025-08-27 ENCOUNTER — HOSPITAL ENCOUNTER (INPATIENT)
Age: 80
LOS: 1 days | Discharge: HOME HEALTH | End: 2025-08-29
Attending: EMERGENCY MEDICINE | Admitting: HOSPITALIST
Payer: MEDICARE

## 2025-08-27 ENCOUNTER — APPOINTMENT (OUTPATIENT)
Dept: GENERAL RADIOLOGY | Age: 80
End: 2025-08-27
Payer: MEDICARE

## 2025-08-27 DIAGNOSIS — S42.292A OTHER CLOSED DISPLACED FRACTURE OF PROXIMAL END OF LEFT HUMERUS, INITIAL ENCOUNTER: Primary | ICD-10-CM

## 2025-08-27 LAB
BASOPHILS # BLD: 0.1 K/UL (ref 0–0.2)
BASOPHILS NFR BLD: 0.6 %
DEPRECATED RDW RBC AUTO: 14.3 % (ref 12.4–15.4)
EOSINOPHIL # BLD: 0.1 K/UL (ref 0–0.6)
EOSINOPHIL NFR BLD: 0.6 %
HCT VFR BLD AUTO: 44.2 % (ref 36–48)
HGB BLD-MCNC: 14.5 G/DL (ref 12–16)
LYMPHOCYTES # BLD: 1.4 K/UL (ref 1–5.1)
LYMPHOCYTES NFR BLD: 10.4 %
MCH RBC QN AUTO: 32.5 PG (ref 26–34)
MCHC RBC AUTO-ENTMCNC: 32.8 G/DL (ref 31–36)
MCV RBC AUTO: 99.1 FL (ref 80–100)
MONOCYTES # BLD: 0.6 K/UL (ref 0–1.3)
MONOCYTES NFR BLD: 4.9 %
NEUTROPHILS # BLD: 10.9 K/UL (ref 1.7–7.7)
NEUTROPHILS NFR BLD: 83.5 %
PLATELET # BLD AUTO: 186 K/UL (ref 135–450)
PMV BLD AUTO: 9.4 FL (ref 5–10.5)
RBC # BLD AUTO: 4.46 M/UL (ref 4–5.2)
WBC # BLD AUTO: 13.1 K/UL (ref 4–11)

## 2025-08-27 PROCEDURE — 99285 EMERGENCY DEPT VISIT HI MDM: CPT

## 2025-08-27 PROCEDURE — 96374 THER/PROPH/DIAG INJ IV PUSH: CPT

## 2025-08-27 PROCEDURE — 73030 X-RAY EXAM OF SHOULDER: CPT

## 2025-08-27 PROCEDURE — 6360000002 HC RX W HCPCS: Performed by: PHYSICIAN ASSISTANT

## 2025-08-27 PROCEDURE — 73080 X-RAY EXAM OF ELBOW: CPT

## 2025-08-27 PROCEDURE — 6370000000 HC RX 637 (ALT 250 FOR IP): Performed by: PHYSICIAN ASSISTANT

## 2025-08-27 PROCEDURE — 80053 COMPREHEN METABOLIC PANEL: CPT

## 2025-08-27 PROCEDURE — 96372 THER/PROPH/DIAG INJ SC/IM: CPT

## 2025-08-27 PROCEDURE — 85025 COMPLETE CBC W/AUTO DIFF WBC: CPT

## 2025-08-27 RX ORDER — OXYCODONE AND ACETAMINOPHEN 5; 325 MG/1; MG/1
1 TABLET ORAL ONCE
Refills: 0 | Status: COMPLETED | OUTPATIENT
Start: 2025-08-27 | End: 2025-08-27

## 2025-08-27 RX ORDER — ACETAMINOPHEN 325 MG/1
650 TABLET ORAL ONCE
Status: COMPLETED | OUTPATIENT
Start: 2025-08-27 | End: 2025-08-27

## 2025-08-27 RX ORDER — KETOROLAC TROMETHAMINE 30 MG/ML
30 INJECTION, SOLUTION INTRAMUSCULAR; INTRAVENOUS ONCE
Status: COMPLETED | OUTPATIENT
Start: 2025-08-27 | End: 2025-08-27

## 2025-08-27 RX ORDER — MORPHINE SULFATE 4 MG/ML
4 INJECTION, SOLUTION INTRAMUSCULAR; INTRAVENOUS ONCE
Status: COMPLETED | OUTPATIENT
Start: 2025-08-27 | End: 2025-08-27

## 2025-08-27 RX ADMIN — KETOROLAC TROMETHAMINE 30 MG: 30 INJECTION, SOLUTION INTRAMUSCULAR at 22:03

## 2025-08-27 RX ADMIN — ACETAMINOPHEN 650 MG: 325 TABLET ORAL at 22:03

## 2025-08-27 RX ADMIN — OXYCODONE AND ACETAMINOPHEN 1 TABLET: 5; 325 TABLET ORAL at 22:17

## 2025-08-27 RX ADMIN — MORPHINE SULFATE 4 MG: 4 INJECTION, SOLUTION INTRAMUSCULAR; INTRAVENOUS at 23:41

## 2025-08-27 ASSESSMENT — PAIN DESCRIPTION - ORIENTATION
ORIENTATION: LEFT

## 2025-08-27 ASSESSMENT — LIFESTYLE VARIABLES
HOW OFTEN DO YOU HAVE A DRINK CONTAINING ALCOHOL: NEVER
HOW MANY STANDARD DRINKS CONTAINING ALCOHOL DO YOU HAVE ON A TYPICAL DAY: PATIENT DOES NOT DRINK

## 2025-08-27 ASSESSMENT — PAIN SCALES - GENERAL
PAINLEVEL_OUTOF10: 9

## 2025-08-27 ASSESSMENT — PAIN - FUNCTIONAL ASSESSMENT
PAIN_FUNCTIONAL_ASSESSMENT: 0-10

## 2025-08-27 ASSESSMENT — PAIN DESCRIPTION - LOCATION
LOCATION: ARM
LOCATION: ARM;SHOULDER
LOCATION: ARM

## 2025-08-27 ASSESSMENT — PAIN DESCRIPTION - DESCRIPTORS
DESCRIPTORS: SHARP;SHOOTING
DESCRIPTORS: ACHING

## 2025-08-28 ENCOUNTER — APPOINTMENT (OUTPATIENT)
Dept: CT IMAGING | Age: 80
End: 2025-08-28
Payer: MEDICARE

## 2025-08-28 ENCOUNTER — ANESTHESIA EVENT (OUTPATIENT)
Dept: OPERATING ROOM | Age: 80
End: 2025-08-28
Payer: MEDICARE

## 2025-08-28 ENCOUNTER — APPOINTMENT (OUTPATIENT)
Dept: GENERAL RADIOLOGY | Age: 80
End: 2025-08-28
Payer: MEDICARE

## 2025-08-28 ENCOUNTER — ANESTHESIA (OUTPATIENT)
Dept: OPERATING ROOM | Age: 80
End: 2025-08-28
Payer: MEDICARE

## 2025-08-28 PROBLEM — S42.202A CLOSED FRACTURE OF LEFT PROXIMAL HUMERUS: Status: ACTIVE | Noted: 2025-08-28

## 2025-08-28 PROBLEM — S42.352A CLOSED DISPLACED COMMINUTED FRACTURE OF SHAFT OF LEFT HUMERUS, INITIAL ENCOUNTER: Status: ACTIVE | Noted: 2025-08-28

## 2025-08-28 LAB
ALBUMIN SERPL-MCNC: 4.6 G/DL (ref 3.4–5)
ALBUMIN/GLOB SERPL: 1.9 {RATIO} (ref 1.1–2.2)
ALP SERPL-CCNC: 105 U/L (ref 40–129)
ALT SERPL-CCNC: 15 U/L (ref 10–40)
ANION GAP SERPL CALCULATED.3IONS-SCNC: 10 MMOL/L (ref 3–16)
AST SERPL-CCNC: 18 U/L (ref 15–37)
BILIRUB SERPL-MCNC: <0.2 MG/DL (ref 0–1)
BUN SERPL-MCNC: 22 MG/DL (ref 7–20)
CALCIUM SERPL-MCNC: 10.1 MG/DL (ref 8.3–10.6)
CHLORIDE SERPL-SCNC: 103 MMOL/L (ref 99–110)
CO2 SERPL-SCNC: 25 MMOL/L (ref 21–32)
CREAT SERPL-MCNC: 0.7 MG/DL (ref 0.6–1.2)
EKG ATRIAL RATE: 91 BPM
EKG DIAGNOSIS: NORMAL
EKG P AXIS: 35 DEGREES
EKG P-R INTERVAL: 164 MS
EKG Q-T INTERVAL: 360 MS
EKG QRS DURATION: 94 MS
EKG QTC CALCULATION (BAZETT): 442 MS
EKG R AXIS: 40 DEGREES
EKG T AXIS: 52 DEGREES
EKG VENTRICULAR RATE: 91 BPM
GFR SERPLBLD CREATININE-BSD FMLA CKD-EPI: 88 ML/MIN/{1.73_M2}
GLUCOSE BLD-MCNC: 133 MG/DL (ref 70–99)
GLUCOSE SERPL-MCNC: 149 MG/DL (ref 70–99)
PERFORMED ON: ABNORMAL
POTASSIUM SERPL-SCNC: 4.8 MMOL/L (ref 3.5–5.1)
PROT SERPL-MCNC: 7 G/DL (ref 6.4–8.2)
SODIUM SERPL-SCNC: 138 MMOL/L (ref 136–145)

## 2025-08-28 PROCEDURE — 2580000003 HC RX 258: Performed by: SPECIALIST/TECHNOLOGIST

## 2025-08-28 PROCEDURE — 2580000003 HC RX 258

## 2025-08-28 PROCEDURE — 2700000000 HC OXYGEN THERAPY PER DAY

## 2025-08-28 PROCEDURE — 3600000004 HC SURGERY LEVEL 4 BASE: Performed by: STUDENT IN AN ORGANIZED HEALTH CARE EDUCATION/TRAINING PROGRAM

## 2025-08-28 PROCEDURE — 6360000002 HC RX W HCPCS: Performed by: STUDENT IN AN ORGANIZED HEALTH CARE EDUCATION/TRAINING PROGRAM

## 2025-08-28 PROCEDURE — 3600000014 HC SURGERY LEVEL 4 ADDTL 15MIN: Performed by: STUDENT IN AN ORGANIZED HEALTH CARE EDUCATION/TRAINING PROGRAM

## 2025-08-28 PROCEDURE — 93005 ELECTROCARDIOGRAM TRACING: CPT | Performed by: INTERNAL MEDICINE

## 2025-08-28 PROCEDURE — 6360000002 HC RX W HCPCS: Performed by: ANESTHESIOLOGY

## 2025-08-28 PROCEDURE — 2709999900 HC NON-CHARGEABLE SUPPLY: Performed by: STUDENT IN AN ORGANIZED HEALTH CARE EDUCATION/TRAINING PROGRAM

## 2025-08-28 PROCEDURE — 2500000003 HC RX 250 WO HCPCS: Performed by: STUDENT IN AN ORGANIZED HEALTH CARE EDUCATION/TRAINING PROGRAM

## 2025-08-28 PROCEDURE — 6370000000 HC RX 637 (ALT 250 FOR IP): Performed by: ANESTHESIOLOGY

## 2025-08-28 PROCEDURE — 2720000010 HC SURG SUPPLY STERILE: Performed by: STUDENT IN AN ORGANIZED HEALTH CARE EDUCATION/TRAINING PROGRAM

## 2025-08-28 PROCEDURE — 94761 N-INVAS EAR/PLS OXIMETRY MLT: CPT

## 2025-08-28 PROCEDURE — 2500000003 HC RX 250 WO HCPCS

## 2025-08-28 PROCEDURE — 73020 X-RAY EXAM OF SHOULDER: CPT

## 2025-08-28 PROCEDURE — 93010 ELECTROCARDIOGRAM REPORT: CPT | Performed by: INTERNAL MEDICINE

## 2025-08-28 PROCEDURE — 3700000000 HC ANESTHESIA ATTENDED CARE: Performed by: STUDENT IN AN ORGANIZED HEALTH CARE EDUCATION/TRAINING PROGRAM

## 2025-08-28 PROCEDURE — L3650 SO 8 ABD RESTRAINT PRE OTS: HCPCS | Performed by: STUDENT IN AN ORGANIZED HEALTH CARE EDUCATION/TRAINING PROGRAM

## 2025-08-28 PROCEDURE — C1776 JOINT DEVICE (IMPLANTABLE): HCPCS | Performed by: STUDENT IN AN ORGANIZED HEALTH CARE EDUCATION/TRAINING PROGRAM

## 2025-08-28 PROCEDURE — 6370000000 HC RX 637 (ALT 250 FOR IP)

## 2025-08-28 PROCEDURE — 7100000001 HC PACU RECOVERY - ADDTL 15 MIN: Performed by: STUDENT IN AN ORGANIZED HEALTH CARE EDUCATION/TRAINING PROGRAM

## 2025-08-28 PROCEDURE — 6360000002 HC RX W HCPCS: Performed by: SPECIALIST/TECHNOLOGIST

## 2025-08-28 PROCEDURE — 3700000001 HC ADD 15 MINUTES (ANESTHESIA): Performed by: STUDENT IN AN ORGANIZED HEALTH CARE EDUCATION/TRAINING PROGRAM

## 2025-08-28 PROCEDURE — 6360000002 HC RX W HCPCS

## 2025-08-28 PROCEDURE — 6360000002 HC RX W HCPCS: Performed by: INTERNAL MEDICINE

## 2025-08-28 PROCEDURE — 73200 CT UPPER EXTREMITY W/O DYE: CPT

## 2025-08-28 PROCEDURE — C1713 ANCHOR/SCREW BN/BN,TIS/BN: HCPCS | Performed by: STUDENT IN AN ORGANIZED HEALTH CARE EDUCATION/TRAINING PROGRAM

## 2025-08-28 PROCEDURE — L3670 SO ACRO/CLAV CAN WEB PRE OTS: HCPCS | Performed by: STUDENT IN AN ORGANIZED HEALTH CARE EDUCATION/TRAINING PROGRAM

## 2025-08-28 PROCEDURE — 0RRK00Z REPLACEMENT OF LEFT SHOULDER JOINT WITH REVERSE BALL AND SOCKET SYNTHETIC SUBSTITUTE, OPEN APPROACH: ICD-10-PCS | Performed by: STUDENT IN AN ORGANIZED HEALTH CARE EDUCATION/TRAINING PROGRAM

## 2025-08-28 PROCEDURE — 7100000000 HC PACU RECOVERY - FIRST 15 MIN: Performed by: STUDENT IN AN ORGANIZED HEALTH CARE EDUCATION/TRAINING PROGRAM

## 2025-08-28 PROCEDURE — 1200000000 HC SEMI PRIVATE

## 2025-08-28 DEVICE — SCREW BONE L30MM DIA6.5MM TI ST FULL THRD CTRL FOR GLEN: Type: IMPLANTABLE DEVICE | Site: SHOULDER | Status: FUNCTIONAL

## 2025-08-28 DEVICE — IMPLANTABLE DEVICE: Type: IMPLANTABLE DEVICE | Site: SHOULDER | Status: FUNCTIONAL

## 2025-08-28 DEVICE — SCREW BONE L14MM DIA5MM TI ST FULL THRD PERIPH FOR GLEN: Type: IMPLANTABLE DEVICE | Site: SHOULDER | Status: FUNCTIONAL

## 2025-08-28 DEVICE — BASEPLATE GLEN DIA25MM +3MM LAT OFFSET AUGMENTED AEQUALIS: Type: IMPLANTABLE DEVICE | Site: SCAPULA | Status: FUNCTIONAL

## 2025-08-28 DEVICE — SCREW BONE L34MM DIA5MM TI ST FULL THRD PERIPH FOR GLEN: Type: IMPLANTABLE DEVICE | Site: SHOULDER | Status: FUNCTIONAL

## 2025-08-28 DEVICE — SCREW BONE L30MM DIA5MM TI ST FULL THRD PERIPH FOR GLEN: Type: IMPLANTABLE DEVICE | Site: SHOULDER | Status: FUNCTIONAL

## 2025-08-28 RX ORDER — SODIUM CHLORIDE 0.9 % (FLUSH) 0.9 %
5-40 SYRINGE (ML) INJECTION EVERY 12 HOURS SCHEDULED
Status: DISCONTINUED | OUTPATIENT
Start: 2025-08-28 | End: 2025-08-29 | Stop reason: HOSPADM

## 2025-08-28 RX ORDER — ONDANSETRON 2 MG/ML
INJECTION INTRAMUSCULAR; INTRAVENOUS
Status: DISCONTINUED | OUTPATIENT
Start: 2025-08-28 | End: 2025-08-28 | Stop reason: SDUPTHER

## 2025-08-28 RX ORDER — OXYCODONE HYDROCHLORIDE 5 MG/1
5 TABLET ORAL PRN
Status: DISCONTINUED | OUTPATIENT
Start: 2025-08-28 | End: 2025-08-28 | Stop reason: HOSPADM

## 2025-08-28 RX ORDER — ROCURONIUM BROMIDE 10 MG/ML
INJECTION, SOLUTION INTRAVENOUS
Status: DISCONTINUED | OUTPATIENT
Start: 2025-08-28 | End: 2025-08-28 | Stop reason: SDUPTHER

## 2025-08-28 RX ORDER — ACETAMINOPHEN 650 MG/1
650 SUPPOSITORY RECTAL EVERY 6 HOURS PRN
Status: DISCONTINUED | OUTPATIENT
Start: 2025-08-28 | End: 2025-08-29 | Stop reason: HOSPADM

## 2025-08-28 RX ORDER — DEXAMETHASONE SODIUM PHOSPHATE 4 MG/ML
INJECTION, SOLUTION INTRA-ARTICULAR; INTRALESIONAL; INTRAMUSCULAR; INTRAVENOUS; SOFT TISSUE
Status: DISCONTINUED | OUTPATIENT
Start: 2025-08-28 | End: 2025-08-28 | Stop reason: SDUPTHER

## 2025-08-28 RX ORDER — MORPHINE SULFATE 2 MG/ML
1 INJECTION, SOLUTION INTRAMUSCULAR; INTRAVENOUS EVERY 4 HOURS PRN
Status: DISCONTINUED | OUTPATIENT
Start: 2025-08-28 | End: 2025-08-28

## 2025-08-28 RX ORDER — ACETAMINOPHEN 325 MG/1
650 TABLET ORAL EVERY 6 HOURS PRN
Status: DISCONTINUED | OUTPATIENT
Start: 2025-08-28 | End: 2025-08-29

## 2025-08-28 RX ORDER — POLYETHYLENE GLYCOL 3350 17 G/17G
17 POWDER, FOR SOLUTION ORAL DAILY PRN
Status: DISCONTINUED | OUTPATIENT
Start: 2025-08-28 | End: 2025-08-29

## 2025-08-28 RX ORDER — LIDOCAINE HYDROCHLORIDE 20 MG/ML
INJECTION, SOLUTION INFILTRATION; PERINEURAL
Status: DISCONTINUED | OUTPATIENT
Start: 2025-08-28 | End: 2025-08-28 | Stop reason: SDUPTHER

## 2025-08-28 RX ORDER — SODIUM CHLORIDE 0.9 % (FLUSH) 0.9 %
5-40 SYRINGE (ML) INJECTION PRN
Status: DISCONTINUED | OUTPATIENT
Start: 2025-08-28 | End: 2025-08-28 | Stop reason: HOSPADM

## 2025-08-28 RX ORDER — SODIUM CHLORIDE 9 MG/ML
INJECTION, SOLUTION INTRAVENOUS PRN
Status: DISCONTINUED | OUTPATIENT
Start: 2025-08-28 | End: 2025-08-28 | Stop reason: HOSPADM

## 2025-08-28 RX ORDER — TRANEXAMIC ACID 100 MG/ML
INJECTION, SOLUTION INTRAVENOUS
Status: DISCONTINUED | OUTPATIENT
Start: 2025-08-28 | End: 2025-08-28 | Stop reason: SDUPTHER

## 2025-08-28 RX ORDER — MAGNESIUM HYDROXIDE 1200 MG/15ML
LIQUID ORAL CONTINUOUS PRN
Status: COMPLETED | OUTPATIENT
Start: 2025-08-28 | End: 2025-08-28

## 2025-08-28 RX ORDER — ENOXAPARIN SODIUM 100 MG/ML
40 INJECTION SUBCUTANEOUS DAILY
Status: DISCONTINUED | OUTPATIENT
Start: 2025-08-28 | End: 2025-08-29

## 2025-08-28 RX ORDER — PHENYLEPHRINE HCL IN 0.9% NACL 1 MG/10 ML
SYRINGE (ML) INTRAVENOUS
Status: DISCONTINUED | OUTPATIENT
Start: 2025-08-28 | End: 2025-08-28 | Stop reason: SDUPTHER

## 2025-08-28 RX ORDER — SODIUM CHLORIDE 0.9 % (FLUSH) 0.9 %
5-40 SYRINGE (ML) INJECTION EVERY 12 HOURS SCHEDULED
Status: DISCONTINUED | OUTPATIENT
Start: 2025-08-28 | End: 2025-08-28 | Stop reason: HOSPADM

## 2025-08-28 RX ORDER — MORPHINE SULFATE 2 MG/ML
2 INJECTION, SOLUTION INTRAMUSCULAR; INTRAVENOUS
Status: DISCONTINUED | OUTPATIENT
Start: 2025-08-28 | End: 2025-08-28

## 2025-08-28 RX ORDER — OXYCODONE AND ACETAMINOPHEN 5; 325 MG/1; MG/1
1 TABLET ORAL EVERY 4 HOURS PRN
Refills: 0 | Status: DISCONTINUED | OUTPATIENT
Start: 2025-08-28 | End: 2025-08-29

## 2025-08-28 RX ORDER — VANCOMYCIN HYDROCHLORIDE 1 G/20ML
INJECTION, POWDER, LYOPHILIZED, FOR SOLUTION INTRAVENOUS PRN
Status: DISCONTINUED | OUTPATIENT
Start: 2025-08-28 | End: 2025-08-28 | Stop reason: ALTCHOICE

## 2025-08-28 RX ORDER — MAGNESIUM SULFATE IN WATER 40 MG/ML
2000 INJECTION, SOLUTION INTRAVENOUS PRN
Status: DISCONTINUED | OUTPATIENT
Start: 2025-08-28 | End: 2025-08-29 | Stop reason: HOSPADM

## 2025-08-28 RX ORDER — LOSARTAN POTASSIUM 100 MG/1
100 TABLET ORAL DAILY
Status: DISCONTINUED | OUTPATIENT
Start: 2025-08-28 | End: 2025-08-29 | Stop reason: HOSPADM

## 2025-08-28 RX ORDER — OXYCODONE HYDROCHLORIDE 5 MG/1
10 TABLET ORAL PRN
Status: DISCONTINUED | OUTPATIENT
Start: 2025-08-28 | End: 2025-08-28 | Stop reason: HOSPADM

## 2025-08-28 RX ORDER — FENTANYL CITRATE 50 UG/ML
INJECTION, SOLUTION INTRAMUSCULAR; INTRAVENOUS
Status: DISCONTINUED | OUTPATIENT
Start: 2025-08-28 | End: 2025-08-28 | Stop reason: SDUPTHER

## 2025-08-28 RX ORDER — ONDANSETRON 2 MG/ML
4 INJECTION INTRAMUSCULAR; INTRAVENOUS EVERY 30 MIN PRN
Status: DISCONTINUED | OUTPATIENT
Start: 2025-08-28 | End: 2025-08-28 | Stop reason: HOSPADM

## 2025-08-28 RX ORDER — ONDANSETRON 4 MG/1
4 TABLET, ORALLY DISINTEGRATING ORAL EVERY 8 HOURS PRN
Status: DISCONTINUED | OUTPATIENT
Start: 2025-08-28 | End: 2025-08-29 | Stop reason: HOSPADM

## 2025-08-28 RX ORDER — ONDANSETRON 2 MG/ML
4 INJECTION INTRAMUSCULAR; INTRAVENOUS EVERY 6 HOURS PRN
Status: DISCONTINUED | OUTPATIENT
Start: 2025-08-28 | End: 2025-08-29 | Stop reason: HOSPADM

## 2025-08-28 RX ORDER — POTASSIUM CHLORIDE 7.45 MG/ML
10 INJECTION INTRAVENOUS PRN
Status: DISCONTINUED | OUTPATIENT
Start: 2025-08-28 | End: 2025-08-29 | Stop reason: HOSPADM

## 2025-08-28 RX ORDER — PROPOFOL 10 MG/ML
INJECTION, EMULSION INTRAVENOUS
Status: DISCONTINUED | OUTPATIENT
Start: 2025-08-28 | End: 2025-08-28 | Stop reason: SDUPTHER

## 2025-08-28 RX ORDER — SODIUM CHLORIDE 0.9 % (FLUSH) 0.9 %
5-40 SYRINGE (ML) INJECTION PRN
Status: DISCONTINUED | OUTPATIENT
Start: 2025-08-28 | End: 2025-08-29 | Stop reason: HOSPADM

## 2025-08-28 RX ORDER — SODIUM CHLORIDE 9 MG/ML
INJECTION, SOLUTION INTRAVENOUS PRN
Status: DISCONTINUED | OUTPATIENT
Start: 2025-08-28 | End: 2025-08-29 | Stop reason: HOSPADM

## 2025-08-28 RX ORDER — ESCITALOPRAM OXALATE 10 MG/1
10 TABLET ORAL DAILY
Status: DISCONTINUED | OUTPATIENT
Start: 2025-08-29 | End: 2025-08-29 | Stop reason: HOSPADM

## 2025-08-28 RX ORDER — AMLODIPINE BESYLATE 5 MG/1
5 TABLET ORAL DAILY
Status: DISCONTINUED | OUTPATIENT
Start: 2025-08-29 | End: 2025-08-29 | Stop reason: HOSPADM

## 2025-08-28 RX ORDER — POTASSIUM CHLORIDE 1500 MG/1
40 TABLET, EXTENDED RELEASE ORAL PRN
Status: DISCONTINUED | OUTPATIENT
Start: 2025-08-28 | End: 2025-08-29 | Stop reason: HOSPADM

## 2025-08-28 RX ADMIN — Medication 10 ML: at 21:14

## 2025-08-28 RX ADMIN — ONDANSETRON 4 MG: 2 INJECTION INTRAMUSCULAR; INTRAVENOUS at 16:43

## 2025-08-28 RX ADMIN — Medication 100 MCG: at 17:29

## 2025-08-28 RX ADMIN — FENTANYL CITRATE 50 MCG: 50 INJECTION, SOLUTION INTRAMUSCULAR; INTRAVENOUS at 17:09

## 2025-08-28 RX ADMIN — ONDANSETRON 4 MG: 2 INJECTION, SOLUTION INTRAMUSCULAR; INTRAVENOUS at 22:58

## 2025-08-28 RX ADMIN — LOSARTAN POTASSIUM 100 MG: 100 TABLET, FILM COATED ORAL at 08:29

## 2025-08-28 RX ADMIN — Medication 100 MCG: at 18:39

## 2025-08-28 RX ADMIN — Medication 2 AMPULE: at 16:27

## 2025-08-28 RX ADMIN — ROCURONIUM BROMIDE 20 MG: 50 INJECTION, SOLUTION INTRAVENOUS at 17:09

## 2025-08-28 RX ADMIN — Medication 10 ML: at 08:31

## 2025-08-28 RX ADMIN — MORPHINE SULFATE 2 MG: 2 INJECTION, SOLUTION INTRAMUSCULAR; INTRAVENOUS at 04:25

## 2025-08-28 RX ADMIN — Medication 100 MCG: at 17:17

## 2025-08-28 RX ADMIN — TRANEXAMIC ACID 1000 MG: 100 INJECTION, SOLUTION INTRAVENOUS at 17:11

## 2025-08-28 RX ADMIN — HYDROMORPHONE HYDROCHLORIDE 0.5 MG: 1 INJECTION, SOLUTION INTRAMUSCULAR; INTRAVENOUS; SUBCUTANEOUS at 19:37

## 2025-08-28 RX ADMIN — ROCURONIUM BROMIDE 20 MG: 50 INJECTION, SOLUTION INTRAVENOUS at 17:55

## 2025-08-28 RX ADMIN — Medication 100 MCG: at 18:06

## 2025-08-28 RX ADMIN — MORPHINE SULFATE 2 MG: 2 INJECTION, SOLUTION INTRAMUSCULAR; INTRAVENOUS at 02:10

## 2025-08-28 RX ADMIN — LIDOCAINE HYDROCHLORIDE 60 MG: 20 INJECTION, SOLUTION INFILTRATION; PERINEURAL at 16:38

## 2025-08-28 RX ADMIN — SUGAMMADEX 200 MG: 100 INJECTION, SOLUTION INTRAVENOUS at 19:02

## 2025-08-28 RX ADMIN — Medication 100 MCG: at 19:00

## 2025-08-28 RX ADMIN — CEFAZOLIN 2000 MG: 2 INJECTION, POWDER, FOR SOLUTION INTRAVENOUS at 17:00

## 2025-08-28 RX ADMIN — Medication 200 MCG: at 17:52

## 2025-08-28 RX ADMIN — DEXAMETHASONE SODIUM PHOSPHATE 4 MG: 4 INJECTION, SOLUTION INTRAMUSCULAR; INTRAVENOUS at 16:43

## 2025-08-28 RX ADMIN — Medication 100 MCG: at 18:12

## 2025-08-28 RX ADMIN — DEXMEDETOMIDINE HYDROCHLORIDE 4 MCG: 100 INJECTION, SOLUTION INTRAVENOUS at 17:45

## 2025-08-28 RX ADMIN — HYDROMORPHONE HYDROCHLORIDE 0.5 MG: 1 INJECTION, SOLUTION INTRAMUSCULAR; INTRAVENOUS; SUBCUTANEOUS at 19:53

## 2025-08-28 RX ADMIN — MORPHINE SULFATE 2 MG: 2 INJECTION, SOLUTION INTRAMUSCULAR; INTRAVENOUS at 08:30

## 2025-08-28 RX ADMIN — PROPOFOL 120 MG: 10 INJECTION, EMULSION INTRAVENOUS at 16:38

## 2025-08-28 RX ADMIN — SODIUM CHLORIDE: 9 INJECTION, SOLUTION INTRAVENOUS at 16:34

## 2025-08-28 RX ADMIN — HYDROMORPHONE HYDROCHLORIDE 0.5 MG: 1 INJECTION, SOLUTION INTRAMUSCULAR; INTRAVENOUS; SUBCUTANEOUS at 11:25

## 2025-08-28 RX ADMIN — Medication 100 MCG: at 18:21

## 2025-08-28 RX ADMIN — Medication 100 MCG: at 18:33

## 2025-08-28 RX ADMIN — FENTANYL CITRATE 50 MCG: 50 INJECTION, SOLUTION INTRAMUSCULAR; INTRAVENOUS at 16:38

## 2025-08-28 RX ADMIN — ONDANSETRON 4 MG: 2 INJECTION, SOLUTION INTRAMUSCULAR; INTRAVENOUS at 13:45

## 2025-08-28 RX ADMIN — ACETAMINOPHEN 650 MG: 325 TABLET ORAL at 08:30

## 2025-08-28 RX ADMIN — ROCURONIUM BROMIDE 50 MG: 50 INJECTION, SOLUTION INTRAVENOUS at 16:38

## 2025-08-28 RX ADMIN — HYDROMORPHONE HYDROCHLORIDE 0.5 MG: 1 INJECTION, SOLUTION INTRAMUSCULAR; INTRAVENOUS; SUBCUTANEOUS at 23:00

## 2025-08-28 ASSESSMENT — PAIN SCALES - GENERAL
PAINLEVEL_OUTOF10: 7
PAINLEVEL_OUTOF10: 7
PAINLEVEL_OUTOF10: 8
PAINLEVEL_OUTOF10: 7
PAINLEVEL_OUTOF10: 8
PAINLEVEL_OUTOF10: 7
PAINLEVEL_OUTOF10: 7
PAINLEVEL_OUTOF10: 8
PAINLEVEL_OUTOF10: 0
PAINLEVEL_OUTOF10: 8
PAINLEVEL_OUTOF10: 9
PAINLEVEL_OUTOF10: 8
PAINLEVEL_OUTOF10: 8

## 2025-08-28 ASSESSMENT — PAIN DESCRIPTION - PAIN TYPE
TYPE: SURGICAL PAIN
TYPE: SURGICAL PAIN
TYPE: ACUTE PAIN
TYPE: SURGICAL PAIN

## 2025-08-28 ASSESSMENT — PAIN - FUNCTIONAL ASSESSMENT
PAIN_FUNCTIONAL_ASSESSMENT: 0-10
PAIN_FUNCTIONAL_ASSESSMENT: PREVENTS OR INTERFERES SOME ACTIVE ACTIVITIES AND ADLS
PAIN_FUNCTIONAL_ASSESSMENT: INTOLERABLE, UNABLE TO DO ANY ACTIVE OR PASSIVE ACTIVITIES
PAIN_FUNCTIONAL_ASSESSMENT: FACE, LEGS, ACTIVITY, CRY, AND CONSOLABILITY (FLACC)
PAIN_FUNCTIONAL_ASSESSMENT: 0-10
PAIN_FUNCTIONAL_ASSESSMENT: PREVENTS OR INTERFERES SOME ACTIVE ACTIVITIES AND ADLS
PAIN_FUNCTIONAL_ASSESSMENT: PREVENTS OR INTERFERES SOME ACTIVE ACTIVITIES AND ADLS
PAIN_FUNCTIONAL_ASSESSMENT: 0-10
PAIN_FUNCTIONAL_ASSESSMENT: 0-10
PAIN_FUNCTIONAL_ASSESSMENT: INTOLERABLE, UNABLE TO DO ANY ACTIVE OR PASSIVE ACTIVITIES
PAIN_FUNCTIONAL_ASSESSMENT: 0-10
PAIN_FUNCTIONAL_ASSESSMENT: 0-10

## 2025-08-28 ASSESSMENT — PAIN DESCRIPTION - LOCATION
LOCATION: SHOULDER
LOCATION: ARM;SHOULDER
LOCATION: ARM;SHOULDER
LOCATION: SHOULDER
LOCATION: ARM
LOCATION: SHOULDER
LOCATION: ARM;SHOULDER

## 2025-08-28 ASSESSMENT — ENCOUNTER SYMPTOMS
ABDOMINAL PAIN: 0
SHORTNESS OF BREATH: 0

## 2025-08-28 ASSESSMENT — PAIN DESCRIPTION - ORIENTATION
ORIENTATION: LEFT

## 2025-08-28 ASSESSMENT — PAIN DESCRIPTION - DESCRIPTORS
DESCRIPTORS: ACHING
DESCRIPTORS: ACHING;NAGGING

## 2025-08-28 ASSESSMENT — COPD QUESTIONNAIRES: CAT_SEVERITY: MODERATE

## 2025-08-29 VITALS
HEIGHT: 62 IN | DIASTOLIC BLOOD PRESSURE: 68 MMHG | HEART RATE: 99 BPM | WEIGHT: 151.4 LBS | TEMPERATURE: 98.2 F | SYSTOLIC BLOOD PRESSURE: 106 MMHG | RESPIRATION RATE: 16 BRPM | BODY MASS INDEX: 27.86 KG/M2 | OXYGEN SATURATION: 93 %

## 2025-08-29 LAB
ANION GAP SERPL CALCULATED.3IONS-SCNC: 9 MMOL/L (ref 3–16)
BUN SERPL-MCNC: 19 MG/DL (ref 7–20)
CALCIUM SERPL-MCNC: 8.7 MG/DL (ref 8.3–10.6)
CHLORIDE SERPL-SCNC: 104 MMOL/L (ref 99–110)
CO2 SERPL-SCNC: 24 MMOL/L (ref 21–32)
CREAT SERPL-MCNC: 0.8 MG/DL (ref 0.6–1.2)
DEPRECATED RDW RBC AUTO: 14.6 % (ref 12.4–15.4)
GFR SERPLBLD CREATININE-BSD FMLA CKD-EPI: 75 ML/MIN/{1.73_M2}
GLUCOSE SERPL-MCNC: 119 MG/DL (ref 70–99)
HCT VFR BLD AUTO: 36.9 % (ref 36–48)
HGB BLD-MCNC: 11.9 G/DL (ref 12–16)
MCH RBC QN AUTO: 32.1 PG (ref 26–34)
MCHC RBC AUTO-ENTMCNC: 32.4 G/DL (ref 31–36)
MCV RBC AUTO: 99 FL (ref 80–100)
PLATELET # BLD AUTO: 168 K/UL (ref 135–450)
PMV BLD AUTO: 9.5 FL (ref 5–10.5)
POTASSIUM SERPL-SCNC: 4.1 MMOL/L (ref 3.5–5.1)
RBC # BLD AUTO: 3.72 M/UL (ref 4–5.2)
SODIUM SERPL-SCNC: 137 MMOL/L (ref 136–145)
WBC # BLD AUTO: 13.8 K/UL (ref 4–11)

## 2025-08-29 PROCEDURE — 85027 COMPLETE CBC AUTOMATED: CPT

## 2025-08-29 PROCEDURE — 97530 THERAPEUTIC ACTIVITIES: CPT

## 2025-08-29 PROCEDURE — 6370000000 HC RX 637 (ALT 250 FOR IP): Performed by: STUDENT IN AN ORGANIZED HEALTH CARE EDUCATION/TRAINING PROGRAM

## 2025-08-29 PROCEDURE — 2500000003 HC RX 250 WO HCPCS: Performed by: STUDENT IN AN ORGANIZED HEALTH CARE EDUCATION/TRAINING PROGRAM

## 2025-08-29 PROCEDURE — 97166 OT EVAL MOD COMPLEX 45 MIN: CPT

## 2025-08-29 PROCEDURE — 99024 POSTOP FOLLOW-UP VISIT: CPT | Performed by: SPECIALIST/TECHNOLOGIST

## 2025-08-29 PROCEDURE — 6360000002 HC RX W HCPCS: Performed by: SPECIALIST/TECHNOLOGIST

## 2025-08-29 PROCEDURE — APPNB45 APP NON BILLABLE 31-45 MINUTES: Performed by: SPECIALIST/TECHNOLOGIST

## 2025-08-29 PROCEDURE — 97116 GAIT TRAINING THERAPY: CPT

## 2025-08-29 PROCEDURE — 97161 PT EVAL LOW COMPLEX 20 MIN: CPT

## 2025-08-29 PROCEDURE — 6370000000 HC RX 637 (ALT 250 FOR IP): Performed by: NURSE PRACTITIONER

## 2025-08-29 PROCEDURE — 6360000002 HC RX W HCPCS: Performed by: STUDENT IN AN ORGANIZED HEALTH CARE EDUCATION/TRAINING PROGRAM

## 2025-08-29 PROCEDURE — 6370000000 HC RX 637 (ALT 250 FOR IP): Performed by: SPECIALIST/TECHNOLOGIST

## 2025-08-29 PROCEDURE — 80048 BASIC METABOLIC PNL TOTAL CA: CPT

## 2025-08-29 PROCEDURE — 2580000003 HC RX 258: Performed by: STUDENT IN AN ORGANIZED HEALTH CARE EDUCATION/TRAINING PROGRAM

## 2025-08-29 PROCEDURE — 36415 COLL VENOUS BLD VENIPUNCTURE: CPT

## 2025-08-29 PROCEDURE — 97535 SELF CARE MNGMENT TRAINING: CPT

## 2025-08-29 RX ORDER — POLYETHYLENE GLYCOL 3350 17 G/17G
17 POWDER, FOR SOLUTION ORAL 2 TIMES DAILY
Status: DISCONTINUED | OUTPATIENT
Start: 2025-08-29 | End: 2025-08-29 | Stop reason: HOSPADM

## 2025-08-29 RX ORDER — ENOXAPARIN SODIUM 100 MG/ML
40 INJECTION SUBCUTANEOUS DAILY
Status: DISCONTINUED | OUTPATIENT
Start: 2025-08-29 | End: 2025-08-29 | Stop reason: HOSPADM

## 2025-08-29 RX ORDER — OXYCODONE HYDROCHLORIDE 5 MG/1
5 TABLET ORAL EVERY 4 HOURS PRN
Status: DISCONTINUED | OUTPATIENT
Start: 2025-08-29 | End: 2025-08-29

## 2025-08-29 RX ORDER — CALCIUM CARBONATE 500 MG/1
500 TABLET, CHEWABLE ORAL 3 TIMES DAILY PRN
Status: DISCONTINUED | OUTPATIENT
Start: 2025-08-29 | End: 2025-08-29 | Stop reason: HOSPADM

## 2025-08-29 RX ORDER — ASPIRIN 81 MG/1
81 TABLET ORAL 2 TIMES DAILY
Qty: 28 TABLET | Refills: 0 | Status: SHIPPED | OUTPATIENT
Start: 2025-08-29 | End: 2025-09-12

## 2025-08-29 RX ORDER — POLYETHYLENE GLYCOL 3350 17 G/17G
17 POWDER, FOR SOLUTION ORAL 2 TIMES DAILY
Qty: 60 PACKET | Refills: 0 | Status: SHIPPED | OUTPATIENT
Start: 2025-08-29 | End: 2025-09-28

## 2025-08-29 RX ORDER — METHOCARBAMOL 500 MG/1
500 TABLET, FILM COATED ORAL 3 TIMES DAILY PRN
Status: DISCONTINUED | OUTPATIENT
Start: 2025-08-29 | End: 2025-08-29 | Stop reason: HOSPADM

## 2025-08-29 RX ORDER — OXYCODONE HYDROCHLORIDE 5 MG/1
5 TABLET ORAL EVERY 6 HOURS PRN
Qty: 28 TABLET | Refills: 0 | Status: SHIPPED | OUTPATIENT
Start: 2025-08-29 | End: 2025-09-05

## 2025-08-29 RX ORDER — ACETAMINOPHEN 325 MG/1
650 TABLET ORAL EVERY 6 HOURS
Qty: 240 TABLET | Refills: 0 | Status: SHIPPED | OUTPATIENT
Start: 2025-08-29 | End: 2025-09-28

## 2025-08-29 RX ORDER — ASPIRIN 81 MG/1
81 TABLET ORAL 2 TIMES DAILY
DISCHARGE
Start: 2025-08-29 | End: 2025-08-29

## 2025-08-29 RX ORDER — CEPHALEXIN 500 MG/1
500 CAPSULE ORAL EVERY 6 HOURS
Qty: 12 CAPSULE | Refills: 0 | Status: SHIPPED | OUTPATIENT
Start: 2025-08-30 | End: 2025-09-02

## 2025-08-29 RX ORDER — OXYCODONE HYDROCHLORIDE 5 MG/1
5 TABLET ORAL EVERY 4 HOURS PRN
Refills: 0 | Status: DISCONTINUED | OUTPATIENT
Start: 2025-08-29 | End: 2025-08-29 | Stop reason: HOSPADM

## 2025-08-29 RX ORDER — OXYCODONE HYDROCHLORIDE 5 MG/1
10 TABLET ORAL EVERY 4 HOURS PRN
Refills: 0 | Status: DISCONTINUED | OUTPATIENT
Start: 2025-08-29 | End: 2025-08-29 | Stop reason: HOSPADM

## 2025-08-29 RX ORDER — ACETAMINOPHEN 325 MG/1
650 TABLET ORAL EVERY 6 HOURS
DISCHARGE
Start: 2025-08-29 | End: 2025-08-29

## 2025-08-29 RX ORDER — ACETAMINOPHEN 325 MG/1
650 TABLET ORAL EVERY 6 HOURS
Status: DISCONTINUED | OUTPATIENT
Start: 2025-08-29 | End: 2025-08-29 | Stop reason: HOSPADM

## 2025-08-29 RX ORDER — CEPHALEXIN 500 MG/1
500 CAPSULE ORAL EVERY 6 HOURS
DISCHARGE
Start: 2025-08-30 | End: 2025-08-29

## 2025-08-29 RX ORDER — METHOCARBAMOL 500 MG/1
500 TABLET, FILM COATED ORAL 3 TIMES DAILY PRN
Qty: 30 TABLET | Refills: 0 | Status: SHIPPED | OUTPATIENT
Start: 2025-08-29 | End: 2025-09-08

## 2025-08-29 RX ORDER — POLYETHYLENE GLYCOL 3350 17 G/17G
17 POWDER, FOR SOLUTION ORAL 2 TIMES DAILY
DISCHARGE
Start: 2025-08-29 | End: 2025-08-29

## 2025-08-29 RX ADMIN — AMLODIPINE BESYLATE 5 MG: 5 TABLET ORAL at 08:39

## 2025-08-29 RX ADMIN — OXYCODONE HYDROCHLORIDE 10 MG: 5 TABLET ORAL at 12:38

## 2025-08-29 RX ADMIN — OXYCODONE HYDROCHLORIDE 5 MG: 5 TABLET ORAL at 03:06

## 2025-08-29 RX ADMIN — HYDROMORPHONE HYDROCHLORIDE 0.5 MG: 1 INJECTION, SOLUTION INTRAMUSCULAR; INTRAVENOUS; SUBCUTANEOUS at 06:34

## 2025-08-29 RX ADMIN — POLYETHYLENE GLYCOL 3350 17 G: 17 POWDER, FOR SOLUTION ORAL at 06:44

## 2025-08-29 RX ADMIN — OXYCODONE AND ACETAMINOPHEN 1 TABLET: 5; 325 TABLET ORAL at 08:37

## 2025-08-29 RX ADMIN — ENOXAPARIN SODIUM 40 MG: 100 INJECTION SUBCUTANEOUS at 10:54

## 2025-08-29 RX ADMIN — CEFAZOLIN 2000 MG: 2 INJECTION, POWDER, FOR SOLUTION INTRAVENOUS at 10:51

## 2025-08-29 RX ADMIN — ACETAMINOPHEN 650 MG: 325 TABLET ORAL at 10:51

## 2025-08-29 RX ADMIN — CEFAZOLIN 2000 MG: 2 INJECTION, POWDER, FOR SOLUTION INTRAVENOUS at 03:12

## 2025-08-29 RX ADMIN — LOSARTAN POTASSIUM 100 MG: 100 TABLET, FILM COATED ORAL at 08:37

## 2025-08-29 RX ADMIN — ESCITALOPRAM OXALATE 10 MG: 10 TABLET ORAL at 08:37

## 2025-08-29 RX ADMIN — CALCIUM CARBONATE 500 MG: 500 TABLET, CHEWABLE ORAL at 05:00

## 2025-08-29 RX ADMIN — Medication 10 ML: at 08:40

## 2025-08-29 ASSESSMENT — PAIN SCALES - GENERAL
PAINLEVEL_OUTOF10: 6
PAINLEVEL_OUTOF10: 7
PAINLEVEL_OUTOF10: 9
PAINLEVEL_OUTOF10: 7

## 2025-08-29 ASSESSMENT — PAIN - FUNCTIONAL ASSESSMENT
PAIN_FUNCTIONAL_ASSESSMENT: 0-10
PAIN_FUNCTIONAL_ASSESSMENT: 0-10
PAIN_FUNCTIONAL_ASSESSMENT: ACTIVITIES ARE NOT PREVENTED
PAIN_FUNCTIONAL_ASSESSMENT: ACTIVITIES ARE NOT PREVENTED
PAIN_FUNCTIONAL_ASSESSMENT: 0-10

## 2025-08-29 ASSESSMENT — PAIN DESCRIPTION - DESCRIPTORS
DESCRIPTORS: ACHING

## 2025-08-29 ASSESSMENT — PAIN DESCRIPTION - ORIENTATION
ORIENTATION: LEFT

## 2025-08-29 ASSESSMENT — PAIN DESCRIPTION - PAIN TYPE: TYPE: SURGICAL PAIN

## 2025-08-29 ASSESSMENT — PAIN DESCRIPTION - LOCATION
LOCATION: SHOULDER
LOCATION: ARM
LOCATION: SHOULDER

## 2025-09-02 ENCOUNTER — TELEPHONE (OUTPATIENT)
Dept: ORTHOPEDIC SURGERY | Age: 80
End: 2025-09-02

## 2025-09-02 DIAGNOSIS — S42.352A CLOSED DISPLACED COMMINUTED FRACTURE OF SHAFT OF LEFT HUMERUS, INITIAL ENCOUNTER: Primary | ICD-10-CM

## 2025-09-02 RX ORDER — HYDROCODONE BITARTRATE AND ACETAMINOPHEN 5; 325 MG/1; MG/1
1 TABLET ORAL EVERY 4 HOURS PRN
Qty: 18 TABLET | Refills: 0 | Status: SHIPPED | OUTPATIENT
Start: 2025-09-02 | End: 2025-09-05

## 2025-09-05 ENCOUNTER — TELEPHONE (OUTPATIENT)
Dept: ORTHOPEDIC SURGERY | Age: 80
End: 2025-09-05

## 2025-09-05 DIAGNOSIS — S42.352A CLOSED DISPLACED COMMINUTED FRACTURE OF SHAFT OF LEFT HUMERUS, INITIAL ENCOUNTER: Primary | ICD-10-CM

## 2025-09-05 RX ORDER — ZOLPIDEM TARTRATE 5 MG/1
5 TABLET ORAL NIGHTLY PRN
Qty: 14 TABLET | Refills: 0 | Status: SHIPPED | OUTPATIENT
Start: 2025-09-05 | End: 2025-09-19

## (undated) DEVICE — Device

## (undated) DEVICE — SYRINGE MED 30ML STD CLR PLAS LUERLOCK TIP N CTRL DISP

## (undated) DEVICE — BIT DRL L500MM DIA6X9MM CANN STP L QUIK CPL FOR DH DC TFN

## (undated) DEVICE — SUTURE VICRYL SZ 0 L36IN ABSRB UD CT-1 L36MM 1/2 CIR TAPR PNT VCP946H

## (undated) DEVICE — PROTECTOR EYE PT SELF ADH NS OPT GRD LF

## (undated) DEVICE — SUTURE STRATAFIX SYMMETRIC SZ 1 L18IN ABSRB VLT CT1 L36CM SXPP1A404

## (undated) DEVICE — SOLUTION IRRIG 2000ML 0.9% SOD CHL USP UROMATIC PLAS CONT

## (undated) DEVICE — ADHESIVE SKIN CLOSURE WND 8.661X1.5 IN 22 CM LIQUIBAND SECUR

## (undated) DEVICE — GLOVE SURG SZ 75 L12IN FNGR THK91MIL STD CRM LTX FREE

## (undated) DEVICE — GOWN SURG 2XL L43IN POLYETH REINF AURORA BRTH HK AND LOOP CLSR

## (undated) DEVICE — BIT DRL SCREW 3.2 MM PERIPH STRL

## (undated) DEVICE — GLOVE SURG SZ 8 L12IN FNGR THK91MIL STD CRM LTX FREE

## (undated) DEVICE — SUTURE MONOCRYL + SZ 4-0 L18IN ABSRB UD L19MM PS-2 3/8 CIR MCP496G

## (undated) DEVICE — BIT DRL L330MM DIA4.2MM CALIB 100MM 3 FLUT QUIK CPL

## (undated) DEVICE — STOCKINETTE ORTH W12XL48IN COT 2 PLY HLLW FOR HANDLING LMB

## (undated) DEVICE — GUIDEWIRE ORTH L400MM DIA3.2MM FOR TFN

## (undated) DEVICE — HANDPIECE IRRIG BTTRY PWR W/ SUCT HI FLO TIP INTERPULSE

## (undated) DEVICE — IMMOBILIZER SHLDR L W8XL18IN COT ADJUSTABLE  SHLDR STRP W  A

## (undated) DEVICE — SYSTEM SKIN CLSR 22CM DERMBND PRINEO

## (undated) DEVICE — STRAP POS W5XL72IN FOAM KNEE AND BODY HK LOOP CLSR DISP

## (undated) DEVICE — SYSTEM WND DEBRIDEMENT AND CLEANSING IRRISEPT

## (undated) DEVICE — SOLUTION IRRIG 500ML 0.9% SOD CHLO USP POUR PLAS BTL

## (undated) DEVICE — GUIDEPIN ORTH 2.5X220 MM SHLDR AEQUALIS PERFORM+

## (undated) DEVICE — DRESSING FOAM 4X8 IN OPTIFOAM GENTLE POSTOP

## (undated) DEVICE — SUTURE VICRYL + SZ 2-0 L18IN ABSRB UD CT1 L36MM 1/2 CIR VCP839D

## (undated) DEVICE — HIP PINNING: Brand: MEDLINE INDUSTRIES, INC.

## (undated) DEVICE — SUTURE ETHIBOND EXCEL SZ 2 L30IN NONABSORBABLE GRN L40MM V-37 MX69G

## (undated) DEVICE — GLOVE SURG SZ 75 L12IN FNGR THK79MIL GRN LTX FREE

## (undated) DEVICE — KIT SHLDR STBL MARCO FOR SPIDER LIMB POS

## (undated) DEVICE — CANNULA SUCT RIG OPN STR TIP W/O CTRL VENT YANKAUERS

## (undated) DEVICE — IMMOBILIZER SHLDR SWTH UNIV DEV BLK P.A.D. III